# Patient Record
Sex: MALE | Race: OTHER | Employment: FULL TIME | URBAN - METROPOLITAN AREA
[De-identification: names, ages, dates, MRNs, and addresses within clinical notes are randomized per-mention and may not be internally consistent; named-entity substitution may affect disease eponyms.]

---

## 2017-05-24 ENCOUNTER — OFFICE VISIT (OUTPATIENT)
Dept: FAMILY MEDICINE CLINIC | Facility: CLINIC | Age: 52
End: 2017-05-24

## 2017-05-24 VITALS
SYSTOLIC BLOOD PRESSURE: 138 MMHG | BODY MASS INDEX: 42.23 KG/M2 | HEART RATE: 73 BPM | WEIGHT: 295 LBS | DIASTOLIC BLOOD PRESSURE: 86 MMHG | OXYGEN SATURATION: 97 % | TEMPERATURE: 98 F | HEIGHT: 70 IN

## 2017-05-24 DIAGNOSIS — I10 ESSENTIAL HYPERTENSION: Primary | ICD-10-CM

## 2017-05-24 DIAGNOSIS — R17 ELEVATED BILIRUBIN: Chronic | ICD-10-CM

## 2017-05-24 DIAGNOSIS — J45.20 MILD INTERMITTENT ASTHMA WITHOUT COMPLICATION: ICD-10-CM

## 2017-05-24 DIAGNOSIS — Z00.00 GENERAL MEDICAL EXAMINATION: ICD-10-CM

## 2017-05-24 PROCEDURE — 99214 OFFICE O/P EST MOD 30 MIN: CPT | Performed by: FAMILY MEDICINE

## 2017-05-24 RX ORDER — ALBUTEROL SULFATE 90 UG/1
2 AEROSOL, METERED RESPIRATORY (INHALATION) EVERY 6 HOURS PRN
Qty: 1 INHALER | Refills: 1 | Status: SHIPPED | OUTPATIENT
Start: 2017-05-24 | End: 2019-03-15

## 2017-05-24 RX ORDER — FLUTICASONE PROPIONATE AND SALMETEROL 250; 50 UG/1; UG/1
1 POWDER RESPIRATORY (INHALATION) EVERY 12 HOURS SCHEDULED
Qty: 1 EACH | Refills: 4 | Status: SHIPPED | OUTPATIENT
Start: 2017-05-24 | End: 2018-10-16

## 2017-05-24 RX ORDER — ATENOLOL AND CHLORTHALIDONE 50; 25 MG/1; MG/1
1 TABLET ORAL DAILY
Qty: 90 TABLET | Refills: 0 | Status: SHIPPED | OUTPATIENT
Start: 2017-05-24 | End: 2017-08-15

## 2017-05-24 NOTE — PROGRESS NOTES
HPI:   Patient presents with:  Medication Request  Hypertension  Asthma      PatientNoe Montes. is a 54year old male who presents for recheck of his hypertension.     Home Blood Pressure monitoring within the normal range atenolol chlorthalidon No past surgical history on file.    Social History    Marital Status:              Spouse Name:                       Years of Education:                 Number of children:               Social History Main Topics    Smoking Status: Never Smoker oint deformities, FROM B UE/LE  PSYCH: mood and affect WNL, speech clear, mood and thought congruent  NEURO: A&O x 3, CNII-XII intact B, motor and sensory grossly intact B UE/LE, gait normal    ASSESSMENT AND PLAN:    1.  Patient is a 54year old male who p Wheezing. fluticasone-salmeterol (ADVAIR DISKUS) 250-50 MCG/DOSE Inhalation Aerosol Powder, Breath Activated 1 each 4      Sig: Inhale 1 puff into the lungs every 12 (twelve) hours.            Imaging & Consults:  CT CALCIUM SCORING    Raf Mays

## 2017-05-25 PROBLEM — R17 ELEVATED BILIRUBIN: Chronic | Status: ACTIVE | Noted: 2017-05-25

## 2017-08-15 RX ORDER — ATENOLOL AND CHLORTHALIDONE 50; 25 MG/1; MG/1
TABLET ORAL
Qty: 30 TABLET | Refills: 0 | Status: SHIPPED | OUTPATIENT
Start: 2017-08-15 | End: 2017-12-01

## 2017-08-24 RX ORDER — ATENOLOL AND CHLORTHALIDONE 50; 25 MG/1; MG/1
TABLET ORAL
Qty: 90 TABLET | Refills: 0 | Status: SHIPPED | OUTPATIENT
Start: 2017-08-24 | End: 2017-12-29

## 2017-11-06 ENCOUNTER — LAB SERVICES (OUTPATIENT)
Dept: OTHER | Age: 52
End: 2017-11-06

## 2017-11-06 ENCOUNTER — CHARTING TRANS (OUTPATIENT)
Dept: OTHER | Age: 52
End: 2017-11-06

## 2017-11-06 LAB — RAPID STREP GROUP A: NORMAL

## 2017-11-06 ASSESSMENT — PAIN SCALES - GENERAL: PAINLEVEL_OUTOF10: 7

## 2017-12-01 RX ORDER — ATENOLOL AND CHLORTHALIDONE 50; 25 MG/1; MG/1
TABLET ORAL
Qty: 30 TABLET | Refills: 0 | Status: SHIPPED | OUTPATIENT
Start: 2017-12-01 | End: 2018-01-29

## 2017-12-01 NOTE — TELEPHONE ENCOUNTER
[address], [place]   Request a refill for:    Atenolol-Chlorthalidone 50-25 mg oral tab  Sig: Take 1 tablet PO QD  Qty: 30    LOV 5/24/17  NOV due now; LMOVM to call for appt.    LRF 8/24/17  No recent labs

## 2018-01-03 RX ORDER — ATENOLOL AND CHLORTHALIDONE 50; 25 MG/1; MG/1
TABLET ORAL
Qty: 30 TABLET | Refills: 0 | Status: SHIPPED | OUTPATIENT
Start: 2018-01-03 | End: 2018-01-28

## 2018-01-29 RX ORDER — ATENOLOL AND CHLORTHALIDONE TABLET 50; 25 MG/1; MG/1
TABLET ORAL
Qty: 30 TABLET | Refills: 0 | Status: SHIPPED | OUTPATIENT
Start: 2018-01-29 | End: 2018-10-16

## 2018-03-21 RX ORDER — ATENOLOL AND CHLORTHALIDONE 50; 25 MG/1; MG/1
TABLET ORAL
Qty: 30 TABLET | Refills: 0 | OUTPATIENT
Start: 2018-03-21

## 2018-03-23 RX ORDER — ATENOLOL AND CHLORTHALIDONE 50; 25 MG/1; MG/1
TABLET ORAL
Qty: 30 TABLET | Refills: 0 | OUTPATIENT
Start: 2018-03-23

## 2018-10-16 NOTE — TELEPHONE ENCOUNTER
A refill request was received for:  Requested Prescriptions     Pending Prescriptions Disp Refills   • fluticasone-salmeterol (ADVAIR DISKUS) 250-50 MCG/DOSE Inhalation Aerosol Powder, Breath Activated 1 each 4     Sig: Inhale 1 puff into the lungs every 1

## 2018-10-17 RX ORDER — FLUTICASONE PROPIONATE AND SALMETEROL 250; 50 UG/1; UG/1
1 POWDER RESPIRATORY (INHALATION) EVERY 12 HOURS SCHEDULED
Qty: 1 EACH | Refills: 0 | Status: SHIPPED | OUTPATIENT
Start: 2018-10-17 | End: 2019-01-08

## 2018-10-17 RX ORDER — ATENOLOL AND CHLORTHALIDONE 50; 25 MG/1; MG/1
1 TABLET ORAL DAILY
Qty: 30 TABLET | Refills: 0 | Status: SHIPPED | OUTPATIENT
Start: 2018-10-17 | End: 2018-11-09

## 2018-10-22 ENCOUNTER — TELEPHONE (OUTPATIENT)
Dept: FAMILY MEDICINE CLINIC | Facility: CLINIC | Age: 53
End: 2018-10-22

## 2018-10-22 NOTE — TELEPHONE ENCOUNTER
Rcvd fax from pt regarding employee health screening labs. Pt indicates on the fax cover sheet his BP has been elevated d/t him not taking BP meds x90 days. Pt states he is back on his medication and is having better BP readings.  Lab results entered into t

## 2018-11-02 VITALS
RESPIRATION RATE: 16 BRPM | BODY MASS INDEX: 41.52 KG/M2 | HEART RATE: 91 BPM | HEIGHT: 70 IN | WEIGHT: 289.99 LBS | TEMPERATURE: 100.7 F

## 2018-11-09 ENCOUNTER — OFFICE VISIT (OUTPATIENT)
Dept: FAMILY MEDICINE CLINIC | Facility: CLINIC | Age: 53
End: 2018-11-09
Payer: COMMERCIAL

## 2018-11-09 DIAGNOSIS — I10 ESSENTIAL HYPERTENSION: ICD-10-CM

## 2018-11-09 DIAGNOSIS — Z12.5 PROSTATE CANCER SCREENING: ICD-10-CM

## 2018-11-09 DIAGNOSIS — B00.1 HERPES LABIALIS: ICD-10-CM

## 2018-11-09 DIAGNOSIS — Z00.00 ROUTINE MEDICAL EXAM: Primary | ICD-10-CM

## 2018-11-09 DIAGNOSIS — J45.20 MILD INTERMITTENT ASTHMA WITHOUT COMPLICATION: ICD-10-CM

## 2018-11-09 DIAGNOSIS — R17 ELEVATED BILIRUBIN: ICD-10-CM

## 2018-11-09 DIAGNOSIS — Z12.11 SCREENING FOR COLON CANCER: ICD-10-CM

## 2018-11-09 PROCEDURE — 85025 COMPLETE CBC W/AUTO DIFF WBC: CPT | Performed by: FAMILY MEDICINE

## 2018-11-09 PROCEDURE — 99396 PREV VISIT EST AGE 40-64: CPT | Performed by: FAMILY MEDICINE

## 2018-11-09 PROCEDURE — 84153 ASSAY OF PSA TOTAL: CPT | Performed by: FAMILY MEDICINE

## 2018-11-09 PROCEDURE — 84443 ASSAY THYROID STIM HORMONE: CPT | Performed by: FAMILY MEDICINE

## 2018-11-09 PROCEDURE — 99213 OFFICE O/P EST LOW 20 MIN: CPT | Performed by: FAMILY MEDICINE

## 2018-11-09 RX ORDER — VALACYCLOVIR HYDROCHLORIDE 1 G/1
TABLET, FILM COATED ORAL
Qty: 8 TABLET | Refills: 1 | Status: SHIPPED | OUTPATIENT
Start: 2018-11-09 | End: 2021-09-23

## 2018-11-09 RX ORDER — ATENOLOL AND CHLORTHALIDONE 50; 25 MG/1; MG/1
1 TABLET ORAL DAILY
Qty: 90 TABLET | Refills: 0 | Status: SHIPPED | OUTPATIENT
Start: 2018-11-09 | End: 2019-01-08

## 2018-11-10 NOTE — H&P
HPI:   Patient presents with:  Physical  Hypertension  Derm Problem  Asthma  Abnormal Labs: elevated Bili-chronic      Denise Green. is a 48year old male who presents for a complete physical exam.     Patient has new complaints of:  Hypertension TSH 2.89 11/09/2018 04:28 PM    T4F 1.2 07/03/2014 07:09 AM        Lab Results   Component Value Date/Time    CHOLEST 137 10/10/2018    HDL 47 10/10/2018    TRIG 112 10/10/2018    LDL 72 10/07/2016 08:35 AM    NONHDLC 88 10/07/2016 08:35 AM       Lab Resul Standard drinks or equivalent per week        Frequency: 2-3 times a week        Drinks per session: 7 to 9        Binge frequency: Weekly      Drug use: No      Sexual activity: Not on file    Other Topics      Concerns:        Caffeine Concern: Not Asked no other significant joint deformities, FROM B UE/LE  PSYCH: mood and affect WNL, speech clear, mood and thought congruent  NEURO: A&O x 3, CNII-XII intact B, motor and sensory grossly intact B UE/LE, DTR's 2/4 B LE, gait WNL     ASSESSMENT AND PLAN:   1. results, follow-up pending results and in 6 months if readings within normal limits  - Atenolol-Chlorthalidone 50-25 MG Oral Tab; Take 1 tablet by mouth daily. Dispense: 90 tablet; Refill: 0    5.  Mild intermittent asthma without complication  Stable, use

## 2018-11-11 ENCOUNTER — TELEPHONE (OUTPATIENT)
Dept: FAMILY MEDICINE CLINIC | Facility: CLINIC | Age: 53
End: 2018-11-11

## 2018-11-11 VITALS
SYSTOLIC BLOOD PRESSURE: 130 MMHG | HEIGHT: 68 IN | BODY MASS INDEX: 45.01 KG/M2 | OXYGEN SATURATION: 97 % | RESPIRATION RATE: 18 BRPM | HEART RATE: 69 BPM | DIASTOLIC BLOOD PRESSURE: 80 MMHG | WEIGHT: 297 LBS

## 2018-11-16 ENCOUNTER — MED REC SCAN ONLY (OUTPATIENT)
Dept: FAMILY MEDICINE CLINIC | Facility: CLINIC | Age: 53
End: 2018-11-16

## 2019-01-08 DIAGNOSIS — I10 ESSENTIAL HYPERTENSION: ICD-10-CM

## 2019-01-08 RX ORDER — ATENOLOL AND CHLORTHALIDONE 50; 25 MG/1; MG/1
1 TABLET ORAL DAILY
Qty: 90 TABLET | Refills: 0 | Status: SHIPPED | OUTPATIENT
Start: 2019-01-08 | End: 2019-03-15

## 2019-01-08 RX ORDER — FLUTICASONE PROPIONATE AND SALMETEROL 250; 50 UG/1; UG/1
1 POWDER RESPIRATORY (INHALATION) EVERY 12 HOURS SCHEDULED
Qty: 1 EACH | Refills: 0 | Status: SHIPPED | OUTPATIENT
Start: 2019-01-08 | End: 2019-03-15

## 2019-01-08 NOTE — TELEPHONE ENCOUNTER
A refill request was received for:  Requested Prescriptions     Pending Prescriptions Disp Refills   • Atenolol-Chlorthalidone 50-25 MG Oral Tab 90 tablet 0     Sig: Take 1 tablet by mouth daily.      Signed Prescriptions Disp Refills   • fluticasone-salmet

## 2019-01-31 PROBLEM — D12.3 BENIGN NEOPLASM OF TRANSVERSE COLON: Status: ACTIVE | Noted: 2019-01-31

## 2019-01-31 PROBLEM — Z12.11 SPECIAL SCREENING FOR MALIGNANT NEOPLASM OF COLON: Status: ACTIVE | Noted: 2019-01-31

## 2019-01-31 PROBLEM — Z80.0 FAMILY HISTORY OF MALIGNANT NEOPLASM OF DIGESTIVE ORGAN: Status: ACTIVE | Noted: 2019-01-31

## 2019-03-14 RX ORDER — FLUTICASONE PROPIONATE AND SALMETEROL 50; 250 UG/1; UG/1
POWDER RESPIRATORY (INHALATION)
Refills: 4 | OUTPATIENT
Start: 2019-03-14

## 2019-03-15 DIAGNOSIS — I10 ESSENTIAL HYPERTENSION: ICD-10-CM

## 2019-03-15 RX ORDER — ALBUTEROL SULFATE 90 UG/1
2 AEROSOL, METERED RESPIRATORY (INHALATION) EVERY 6 HOURS PRN
Qty: 1 INHALER | Refills: 1 | Status: SHIPPED | OUTPATIENT
Start: 2019-03-15 | End: 2019-05-03

## 2019-03-15 RX ORDER — ATENOLOL AND CHLORTHALIDONE 50; 25 MG/1; MG/1
1 TABLET ORAL DAILY
Qty: 90 TABLET | Refills: 0 | Status: SHIPPED | OUTPATIENT
Start: 2019-03-15 | End: 2019-09-24

## 2019-03-15 RX ORDER — FLUTICASONE PROPIONATE AND SALMETEROL 250; 50 UG/1; UG/1
1 POWDER RESPIRATORY (INHALATION) EVERY 12 HOURS SCHEDULED
Qty: 1 EACH | Refills: 0 | Status: SHIPPED | OUTPATIENT
Start: 2019-03-15 | End: 2019-03-23

## 2019-03-15 NOTE — TELEPHONE ENCOUNTER
A refill request was received for:  Requested Prescriptions     Pending Prescriptions Disp Refills   • Atenolol-Chlorthalidone 50-25 MG Oral Tab 90 tablet 0     Sig: Take 1 tablet by mouth daily.      Signed Prescriptions Disp Refills   • Albuterol Sulfate

## 2019-03-25 RX ORDER — FLUTICASONE PROPIONATE AND SALMETEROL 50; 250 UG/1; UG/1
POWDER RESPIRATORY (INHALATION)
Qty: 60 EACH | Refills: 0 | Status: SHIPPED | OUTPATIENT
Start: 2019-03-25 | End: 2019-08-01

## 2019-05-10 ENCOUNTER — OFFICE VISIT (OUTPATIENT)
Dept: FAMILY MEDICINE CLINIC | Facility: CLINIC | Age: 54
End: 2019-05-10
Payer: COMMERCIAL

## 2019-05-10 VITALS
HEART RATE: 58 BPM | SYSTOLIC BLOOD PRESSURE: 132 MMHG | OXYGEN SATURATION: 100 % | HEIGHT: 68 IN | WEIGHT: 303 LBS | BODY MASS INDEX: 45.92 KG/M2 | RESPIRATION RATE: 18 BRPM | DIASTOLIC BLOOD PRESSURE: 82 MMHG

## 2019-05-10 DIAGNOSIS — Z00.00 ROUTINE MEDICAL EXAM: ICD-10-CM

## 2019-05-10 DIAGNOSIS — J45.20 MILD INTERMITTENT ASTHMA WITHOUT COMPLICATION: ICD-10-CM

## 2019-05-10 DIAGNOSIS — R17 ELEVATED BILIRUBIN: ICD-10-CM

## 2019-05-10 DIAGNOSIS — Z12.5 PROSTATE CANCER SCREENING: ICD-10-CM

## 2019-05-10 DIAGNOSIS — I10 ESSENTIAL HYPERTENSION: Primary | ICD-10-CM

## 2019-05-10 PROCEDURE — 99214 OFFICE O/P EST MOD 30 MIN: CPT | Performed by: FAMILY MEDICINE

## 2019-05-10 NOTE — PROGRESS NOTES
HPI:   Patient presents with:  HTN  Asthma      Patient Raenelle Schaumann. is a 48year old male who presents for recheck of his hypertension.     Blood Pressure monitoring within the normal range-doesn't check at home  Current medication:  See below: TWELVE HOURS  Disp: 60 each Rfl: 0   Atenolol-Chlorthalidone 50-25 MG Oral Tab Take 1 tablet by mouth daily.  Disp: 90 tablet Rfl: 0   ValACYclovir HCl (VALTREX) 1 G Oral Tab TAKE 2 TABS PO BID X 1-2 DAYS PRN for cold spres Disp: 8 tablet Rfl: 1     No curr 68\"   Wt (!) 303 lb   SpO2 100%   BMI 46.07 kg/m²  Body mass index is 46.07 kg/m².   Wt Readings from Last 3 Encounters:  05/10/19 : (!) 303 lb  11/09/18 : 297 lb  05/24/17 : 295 lb    BP Readings from Last 3 Encounters:  05/10/19 : 132/82  11/09/18 : 130/ exam  RTC 6 mo  - CBC WITH DIFFERENTIAL WITH PLATELET; Future  - COMP METABOLIC PANEL (14); Future  - LIPID PANEL; Future  - EKG 12-LEAD; Future    5.  Prostate cancer screening  mikey w labs in 6 mo  - PSA SCREEN; Future        Meds & Refills for this Visit:

## 2019-08-01 RX ORDER — FLUTICASONE PROPIONATE AND SALMETEROL 50; 250 UG/1; UG/1
POWDER RESPIRATORY (INHALATION)
Qty: 60 EACH | Refills: 0 | Status: SHIPPED | OUTPATIENT
Start: 2019-08-01 | End: 2019-10-30

## 2019-08-01 NOTE — TELEPHONE ENCOUNTER
Rx passes protocol - rx refilled.   Asthma; AAS > 20, apt in past 6 m, AAP and ACT given in past 12 m

## 2019-09-24 DIAGNOSIS — I10 ESSENTIAL HYPERTENSION: ICD-10-CM

## 2019-09-24 RX ORDER — ATENOLOL AND CHLORTHALIDONE 50; 25 MG/1; MG/1
TABLET ORAL
Qty: 60 TABLET | Refills: 0 | Status: SHIPPED | OUTPATIENT
Start: 2019-09-24 | End: 2019-11-15

## 2019-09-24 NOTE — TELEPHONE ENCOUNTER
A refill request was received for:  Requested Prescriptions     Pending Prescriptions Disp Refills   • ATENOLOL-CHLORTHALIDONE 50-25 MG Oral Tab [Pharmacy Med Name: Atenolol-Chlorthalidone Oral Tablet 50-25 MG] 30 tablet 0     Sig: TAKE 1 TABLET BY MOUTH O

## 2019-10-30 DIAGNOSIS — I10 ESSENTIAL HYPERTENSION: ICD-10-CM

## 2019-10-30 RX ORDER — FLUTICASONE PROPIONATE AND SALMETEROL 50; 250 UG/1; UG/1
POWDER RESPIRATORY (INHALATION)
Qty: 1 EACH | Refills: 0 | Status: SHIPPED | OUTPATIENT
Start: 2019-10-30 | End: 2019-11-15

## 2019-10-30 RX ORDER — ATENOLOL AND CHLORTHALIDONE 50; 25 MG/1; MG/1
TABLET ORAL
Qty: 90 TABLET | Refills: 0 | Status: SHIPPED | OUTPATIENT
Start: 2019-10-30 | End: 2019-11-15

## 2019-11-15 ENCOUNTER — APPOINTMENT (OUTPATIENT)
Dept: LAB | Facility: REFERENCE LAB | Age: 54
End: 2019-11-15
Attending: FAMILY MEDICINE
Payer: COMMERCIAL

## 2019-11-15 ENCOUNTER — OFFICE VISIT (OUTPATIENT)
Dept: FAMILY MEDICINE CLINIC | Facility: CLINIC | Age: 54
End: 2019-11-15
Payer: COMMERCIAL

## 2019-11-15 ENCOUNTER — APPOINTMENT (OUTPATIENT)
Dept: LAB | Age: 54
End: 2019-11-15
Attending: FAMILY MEDICINE
Payer: COMMERCIAL

## 2019-11-15 VITALS
HEART RATE: 52 BPM | TEMPERATURE: 99 F | SYSTOLIC BLOOD PRESSURE: 134 MMHG | OXYGEN SATURATION: 96 % | HEIGHT: 68.5 IN | DIASTOLIC BLOOD PRESSURE: 80 MMHG | WEIGHT: 304 LBS | BODY MASS INDEX: 45.54 KG/M2 | RESPIRATION RATE: 18 BRPM

## 2019-11-15 DIAGNOSIS — I10 ESSENTIAL HYPERTENSION: ICD-10-CM

## 2019-11-15 DIAGNOSIS — Z12.5 PROSTATE CANCER SCREENING: ICD-10-CM

## 2019-11-15 DIAGNOSIS — Z00.00 ROUTINE MEDICAL EXAM: ICD-10-CM

## 2019-11-15 DIAGNOSIS — J06.9 UPPER RESPIRATORY TRACT INFECTION, UNSPECIFIED TYPE: ICD-10-CM

## 2019-11-15 DIAGNOSIS — I10 ESSENTIAL HYPERTENSION: Primary | ICD-10-CM

## 2019-11-15 DIAGNOSIS — J45.20 MILD INTERMITTENT ASTHMA WITHOUT COMPLICATION: ICD-10-CM

## 2019-11-15 PROBLEM — B00.1 HERPES LABIALIS: Status: ACTIVE | Noted: 2019-11-15

## 2019-11-15 PROCEDURE — 99214 OFFICE O/P EST MOD 30 MIN: CPT | Performed by: FAMILY MEDICINE

## 2019-11-15 PROCEDURE — 36415 COLL VENOUS BLD VENIPUNCTURE: CPT | Performed by: FAMILY MEDICINE

## 2019-11-15 PROCEDURE — 93010 ELECTROCARDIOGRAM REPORT: CPT | Performed by: FAMILY MEDICINE

## 2019-11-15 PROCEDURE — 85025 COMPLETE CBC W/AUTO DIFF WBC: CPT | Performed by: FAMILY MEDICINE

## 2019-11-15 PROCEDURE — 80061 LIPID PANEL: CPT | Performed by: FAMILY MEDICINE

## 2019-11-15 PROCEDURE — 80053 COMPREHEN METABOLIC PANEL: CPT | Performed by: FAMILY MEDICINE

## 2019-11-15 PROCEDURE — 93005 ELECTROCARDIOGRAM TRACING: CPT

## 2019-11-15 RX ORDER — ATENOLOL AND CHLORTHALIDONE 50; 25 MG/1; MG/1
TABLET ORAL
Qty: 90 TABLET | Refills: 0 | Status: SHIPPED | OUTPATIENT
Start: 2019-11-15 | End: 2020-03-02

## 2019-11-15 RX ORDER — AZITHROMYCIN 500 MG/1
500 TABLET, FILM COATED ORAL DAILY
Qty: 5 TABLET | Refills: 0 | Status: SHIPPED | OUTPATIENT
Start: 2019-11-15 | End: 2019-11-20

## 2019-11-15 RX ORDER — FLUTICASONE PROPIONATE AND SALMETEROL 250; 50 UG/1; UG/1
POWDER RESPIRATORY (INHALATION)
Qty: 1 EACH | Refills: 3 | Status: SHIPPED | OUTPATIENT
Start: 2019-11-15 | End: 2020-02-14

## 2019-11-15 RX ORDER — AZITHROMYCIN 500 MG/1
500 TABLET, FILM COATED ORAL DAILY
Qty: 5 TABLET | Refills: 0 | Status: SHIPPED | OUTPATIENT
Start: 2019-11-15 | End: 2019-11-15

## 2019-11-15 NOTE — PROGRESS NOTES
HPI:   Patient presents with:  URI  Cough  Sore Throat  Asthma      Patient Lynn Kim. is a 48year old male who presents for recheck of his hypertension.     Home Blood Pressure monitoring mildly elevated-'s  Current medication:  See be Medications:  PROAIR  (90 Base) MCG/ACT Inhalation Aero Soln, INHALE 2 PUFFS BY MOUTH EVERY SIX HOURS AS NEEDED  for wheezing., Disp: 8.5 g, Rfl: 0  ValACYclovir HCl (VALTREX) 1 G Oral Tab, TAKE 2 TABS PO BID X 1-2 DAYS PRN for cold spres, Disp: 8 Location: Right arm)   Pulse 52   Temp 98.7 °F (37.1 °C) (Oral)   Resp 18   Ht 68.5\"   Wt (!) 304 lb (137.9 kg)   SpO2 96%    L/min   BMI 45.55 kg/m²  Body mass index is 45.55 kg/m².   Wt Readings from Last 3 Encounters:  11/15/19 : (!) 304 lb (137.9 Atenolol-Chlorthalidone 50-25 MG Oral Tab; TAKE 1 TABLET BY MOUTH ONE TIME A DAY  Dispense: 90 tablet; Refill: 0  - CT CALCIUM SCORING; Future    2.  Mild intermittent asthma without complication  Stable, will CPM, f/u 6 mo   ACT: 23, AAP completed today, p

## 2019-11-21 ENCOUNTER — TELEPHONE (OUTPATIENT)
Dept: FAMILY MEDICINE CLINIC | Facility: CLINIC | Age: 54
End: 2019-11-21

## 2019-11-21 NOTE — TELEPHONE ENCOUNTER
Rcvd pt's CT Calcium Scoring from: [facility]    Left Main Artery: 0  Left Anterior Descendin  Left Circumflex: 0  Right Coronary Artery: 0  Total Score: 18  Report placed on provider's desk for review; send to scan when complete.

## 2020-02-13 ENCOUNTER — TELEPHONE (OUTPATIENT)
Dept: FAMILY MEDICINE CLINIC | Facility: CLINIC | Age: 55
End: 2020-02-13

## 2020-02-13 NOTE — TELEPHONE ENCOUNTER
Please see if patient can come in at 2:30 on 2/14/20 instead of 3:30 (if not then a little earlier if able, if not ok too, as I'm flying [place] directly after work that day) , then block rest of day, ok to open up ALL other C___ family spots (no need to place

## 2020-02-13 NOTE — TELEPHONE ENCOUNTER
Patient is now at 11:30a for 2/14/20. Prescription sent for Linzess 290mcg take 145mcg q day.  Informed pt that note in Op note says increase to Linzess 290mcg q day.

## 2020-02-14 ENCOUNTER — OFFICE VISIT (OUTPATIENT)
Dept: FAMILY MEDICINE CLINIC | Facility: CLINIC | Age: 55
End: 2020-02-14
Payer: COMMERCIAL

## 2020-02-14 VITALS
WEIGHT: 295 LBS | BODY MASS INDEX: 45.23 KG/M2 | RESPIRATION RATE: 18 BRPM | DIASTOLIC BLOOD PRESSURE: 78 MMHG | SYSTOLIC BLOOD PRESSURE: 120 MMHG | TEMPERATURE: 98 F | OXYGEN SATURATION: 98 % | HEART RATE: 58 BPM | HEIGHT: 67.8 IN

## 2020-02-14 DIAGNOSIS — Z00.00 ROUTINE MEDICAL EXAM: Primary | ICD-10-CM

## 2020-02-14 DIAGNOSIS — R17 ELEVATED BILIRUBIN: ICD-10-CM

## 2020-02-14 DIAGNOSIS — Z12.11 SCREENING FOR COLON CANCER: ICD-10-CM

## 2020-02-14 DIAGNOSIS — I10 ESSENTIAL HYPERTENSION: ICD-10-CM

## 2020-02-14 DIAGNOSIS — J45.20 MILD INTERMITTENT ASTHMA WITHOUT COMPLICATION: ICD-10-CM

## 2020-02-14 PROCEDURE — 90715 TDAP VACCINE 7 YRS/> IM: CPT | Performed by: FAMILY MEDICINE

## 2020-02-14 PROCEDURE — 99396 PREV VISIT EST AGE 40-64: CPT | Performed by: FAMILY MEDICINE

## 2020-02-14 PROCEDURE — 99213 OFFICE O/P EST LOW 20 MIN: CPT | Performed by: FAMILY MEDICINE

## 2020-02-14 PROCEDURE — 90471 IMMUNIZATION ADMIN: CPT | Performed by: FAMILY MEDICINE

## 2020-02-14 RX ORDER — FLUTICASONE PROPIONATE AND SALMETEROL 250; 50 UG/1; UG/1
POWDER RESPIRATORY (INHALATION)
Qty: 1 EACH | Refills: 3 | Status: SHIPPED | OUTPATIENT
Start: 2020-02-14 | End: 2021-05-18

## 2020-02-14 NOTE — H&P
HPI:   Patient presents with:  Physical  HTN  Asthma      Lowell Ross. is a 48year old male who presents for a complete physical exam.     Patient has new complaints of:  Hypertension recheck:  Home Blood Pressure monitoring has  been within th Jerry 93 11/15/2019 08:54 AM       Lab Results   Component Value Date/Time    A1C 4.8 10/07/2016 08:35 AM      No results found for: VITD        Current Outpatient Medications   Medication Sig Dispense Refill   • fluticasone-salmeterol (ADVAIR DISKUS) 25 Binge frequency: Weekly        Comment: Beer      Drug use: No    Other Topics      Concerns:        REVIEW OF SYSTEMS:   Pertinent positives and negatives noted in the the HPI.  Specifically:  GEN:  No fever or fatigue  HEAD:  No headaches  EYES:  No visio grossly NL B UE/LE, no other significant joint deformities, FROM B UE/LE  PSYCH: mood and affect WNL, speech clear, mood and thought congruent  NEURO: A&O x 3, CNII-XII intact B, motor and sensory grossly intact B UE/LE, mobile mass 2/4 B LE, gait WNL Encounter      Tdap      Meds & Refills for this Visit:  Requested Prescriptions     Signed Prescriptions Disp Refills   • fluticasone-salmeterol (ADVAIR DISKUS) 250-50 MCG/DOSE Inhalation Aerosol Powder, Breath Activated 1 each 3     Sig: INHALE 1 PUFF BY

## 2020-02-29 DIAGNOSIS — I10 ESSENTIAL HYPERTENSION: ICD-10-CM

## 2020-03-02 RX ORDER — ATENOLOL AND CHLORTHALIDONE 50; 25 MG/1; MG/1
TABLET ORAL
Qty: 90 TABLET | Refills: 0 | Status: SHIPPED | OUTPATIENT
Start: 2020-03-02 | End: 2020-08-05

## 2020-08-05 DIAGNOSIS — I10 ESSENTIAL HYPERTENSION: ICD-10-CM

## 2020-08-05 RX ORDER — ATENOLOL AND CHLORTHALIDONE 50; 25 MG/1; MG/1
TABLET ORAL
Qty: 90 TABLET | Refills: 0 | Status: SHIPPED | OUTPATIENT
Start: 2020-08-05 | End: 2020-11-19

## 2020-08-30 ENCOUNTER — TELEPHONE (OUTPATIENT)
Dept: FAMILY MEDICINE CLINIC | Facility: CLINIC | Age: 55
End: 2020-08-30

## 2020-08-31 NOTE — TELEPHONE ENCOUNTER
Patient has office visit with me Tuesday morning.   Please let him know that the lab in our building is closed

## 2020-08-31 NOTE — TELEPHONE ENCOUNTER
LVM for pt informing him of below and that labs require scheduled appts at this time. Provided central scheduling phone number in message.

## 2020-09-01 ENCOUNTER — OFFICE VISIT (OUTPATIENT)
Dept: FAMILY MEDICINE CLINIC | Facility: CLINIC | Age: 55
End: 2020-09-01
Payer: COMMERCIAL

## 2020-09-01 VITALS
WEIGHT: 282 LBS | OXYGEN SATURATION: 97 % | HEIGHT: 67.8 IN | SYSTOLIC BLOOD PRESSURE: 130 MMHG | DIASTOLIC BLOOD PRESSURE: 80 MMHG | BODY MASS INDEX: 43.24 KG/M2 | HEART RATE: 53 BPM

## 2020-09-01 DIAGNOSIS — M79.641 HAND PAIN, RIGHT: ICD-10-CM

## 2020-09-01 DIAGNOSIS — Z00.00 ROUTINE MEDICAL EXAM: ICD-10-CM

## 2020-09-01 DIAGNOSIS — R17 ELEVATED BILIRUBIN: ICD-10-CM

## 2020-09-01 DIAGNOSIS — J45.20 MILD INTERMITTENT ASTHMA WITHOUT COMPLICATION: ICD-10-CM

## 2020-09-01 DIAGNOSIS — G62.9 NEUROPATHY: ICD-10-CM

## 2020-09-01 DIAGNOSIS — I10 ESSENTIAL HYPERTENSION: Primary | ICD-10-CM

## 2020-09-01 DIAGNOSIS — Z12.5 PROSTATE CANCER SCREENING: ICD-10-CM

## 2020-09-01 PROCEDURE — 3075F SYST BP GE 130 - 139MM HG: CPT | Performed by: FAMILY MEDICINE

## 2020-09-01 PROCEDURE — 99214 OFFICE O/P EST MOD 30 MIN: CPT | Performed by: FAMILY MEDICINE

## 2020-09-01 PROCEDURE — 3079F DIAST BP 80-89 MM HG: CPT | Performed by: FAMILY MEDICINE

## 2020-09-01 PROCEDURE — 3008F BODY MASS INDEX DOCD: CPT | Performed by: FAMILY MEDICINE

## 2020-09-01 NOTE — H&P
HPI:   Patient presents with: Follow - Up  HTN  Musculoskeletal Problem  Asthma      Patient Efrain Osborn. is a 48year old male who presents for recheck of his hypertension.     Home Blood Pressure monitoring within the normal range  Current med 08:54 AM    AST 18 11/15/2019 08:54 AM    ALT 28 11/15/2019 08:54 AM    BILT 1.4 11/15/2019 08:54 AM    TSH 2.89 11/09/2018 04:28 PM    T4F 1.2 07/03/2014 07:09 AM           Medications:  ATENOLOL-CHLORTHALIDONE 50-25 MG Oral Tab, TAKE 1 TABLET BY MOUTH ON excessive fatigue  HEAD:  No headaches, no dizziness  EYES:  No new vision change or complaints  EARS:  No new hearing loss, no ear pain  MOUTH/THROAT:  No sore throat or dental problems, no oral lesions  HEART:  No chest pain or palpitations  LUNG:  No SO CPM  · Recommend low fat, healthy DASH diet and exercise 3-4 times a week. · The patient is asked to return in 6 month(s). · The patient indicates understanding of these recommendtions and agrees to the above plan. Additional Assessment and Plan:  1.

## 2020-10-17 ENCOUNTER — OFFICE VISIT (OUTPATIENT)
Dept: FAMILY MEDICINE CLINIC | Facility: CLINIC | Age: 55
End: 2020-10-17
Payer: COMMERCIAL

## 2020-10-17 VITALS
BODY MASS INDEX: 41.47 KG/M2 | TEMPERATURE: 98 F | WEIGHT: 280 LBS | HEIGHT: 69 IN | HEART RATE: 104 BPM | SYSTOLIC BLOOD PRESSURE: 144 MMHG | OXYGEN SATURATION: 97 % | DIASTOLIC BLOOD PRESSURE: 76 MMHG | RESPIRATION RATE: 18 BRPM

## 2020-10-17 DIAGNOSIS — Z20.822 EXPOSURE TO COVID-19 VIRUS: Primary | ICD-10-CM

## 2020-10-17 PROCEDURE — 90471 IMMUNIZATION ADMIN: CPT | Performed by: NURSE PRACTITIONER

## 2020-10-17 PROCEDURE — 99213 OFFICE O/P EST LOW 20 MIN: CPT | Performed by: NURSE PRACTITIONER

## 2020-10-17 PROCEDURE — 3008F BODY MASS INDEX DOCD: CPT | Performed by: NURSE PRACTITIONER

## 2020-10-17 PROCEDURE — U0003 INFECTIOUS AGENT DETECTION BY NUCLEIC ACID (DNA OR RNA); SEVERE ACUTE RESPIRATORY SYNDROME CORONAVIRUS 2 (SARS-COV-2) (CORONAVIRUS DISEASE [COVID-19]), AMPLIFIED PROBE TECHNIQUE, MAKING USE OF HIGH THROUGHPUT TECHNOLOGIES AS DESCRIBED BY CMS-2020-01-R: HCPCS | Performed by: NURSE PRACTITIONER

## 2020-10-17 PROCEDURE — 3078F DIAST BP <80 MM HG: CPT | Performed by: NURSE PRACTITIONER

## 2020-10-17 PROCEDURE — 90686 IIV4 VACC NO PRSV 0.5 ML IM: CPT | Performed by: NURSE PRACTITIONER

## 2020-10-17 PROCEDURE — 3077F SYST BP >= 140 MM HG: CPT | Performed by: NURSE PRACTITIONER

## 2020-10-17 NOTE — PROGRESS NOTES
CHIEF COMPLAINT:   Patient presents with:  Covid: exposure to covid went to house party last saturday       HPI:   Raenelle Schaumann. is a 48year old male who presents for Covid 19 exposure 7 days ago. Reports no symptoms. Requesting covid testing. GI: denies N/V/C or abdominal pain  NEURO: Denies headaches    EXAM:   /76   Pulse 104   Temp 98.1 °F (36.7 °C) (Oral)   Resp 18   Ht 69\"   Wt 280 lb (127 kg)   SpO2 97%   BMI 41.35 kg/m²   GENERAL: well developed, well nourished, in no apparent d

## 2020-11-18 DIAGNOSIS — I10 ESSENTIAL HYPERTENSION: ICD-10-CM

## 2020-11-19 RX ORDER — ATENOLOL AND CHLORTHALIDONE 50; 25 MG/1; MG/1
TABLET ORAL
Qty: 90 TABLET | Refills: 0 | Status: SHIPPED | OUTPATIENT
Start: 2020-11-19 | End: 2021-02-23

## 2020-11-19 NOTE — TELEPHONE ENCOUNTER
A refill request was received for:  Requested Prescriptions     Pending Prescriptions Disp Refills   • ATENOLOL-CHLORTHALIDONE 50-25 MG Oral Tab [Pharmacy Med Name: Atenolol-Chlorthalidone Oral Tablet 50-25 MG] 90 tablet 0     Sig: TAKE 1 TABLET BY MOUTH O

## 2021-02-23 DIAGNOSIS — I10 ESSENTIAL HYPERTENSION: ICD-10-CM

## 2021-02-23 RX ORDER — ATENOLOL AND CHLORTHALIDONE 50; 25 MG/1; MG/1
TABLET ORAL
Qty: 90 TABLET | Refills: 0 | Status: SHIPPED | OUTPATIENT
Start: 2021-02-23 | End: 2021-06-18

## 2021-02-23 NOTE — TELEPHONE ENCOUNTER
A refill request was received for:  Requested Prescriptions     Pending Prescriptions Disp Refills   • Atenolol-Chlorthalidone 50-25 MG Oral Tab [Pharmacy Med Name: Atenolol-Chlorthalidone Oral Tablet 50-25 MG] 90 tablet 0     Sig: TAKE 1 TABLET BY MOUTH E

## 2021-04-01 ENCOUNTER — TELEPHONE (OUTPATIENT)
Dept: FAMILY MEDICINE CLINIC | Facility: CLINIC | Age: 56
End: 2021-04-01

## 2021-04-01 ENCOUNTER — IMMUNIZATION (OUTPATIENT)
Dept: LAB | Age: 56
End: 2021-04-01
Attending: HOSPITALIST
Payer: COMMERCIAL

## 2021-04-01 DIAGNOSIS — Z23 NEED FOR VACCINATION: Primary | ICD-10-CM

## 2021-04-01 PROCEDURE — 0001A SARSCOV2 VAC 30MCG/0.3ML IM: CPT

## 2021-04-22 ENCOUNTER — IMMUNIZATION (OUTPATIENT)
Dept: LAB | Age: 56
End: 2021-04-22
Attending: HOSPITALIST
Payer: COMMERCIAL

## 2021-04-22 DIAGNOSIS — Z23 NEED FOR VACCINATION: Primary | ICD-10-CM

## 2021-04-22 PROCEDURE — 0002A SARSCOV2 VAC 30MCG/0.3ML IM: CPT

## 2021-05-18 DIAGNOSIS — J45.20 MILD INTERMITTENT ASTHMA WITHOUT COMPLICATION: ICD-10-CM

## 2021-05-18 RX ORDER — FLUTICASONE PROPIONATE AND SALMETEROL 250; 50 UG/1; UG/1
POWDER RESPIRATORY (INHALATION)
Qty: 1 EACH | Refills: 3 | Status: SHIPPED | OUTPATIENT
Start: 2021-05-18 | End: 2021-10-20

## 2021-05-18 NOTE — TELEPHONE ENCOUNTER
A refill request was received for:  Requested Prescriptions     Pending Prescriptions Disp Refills   • fluticasone-salmeterol (ADVAIR DISKUS) 250-50 MCG/DOSE Inhalation Aerosol Powder, Breath Activated 1 each 3     Sig: INHALE 1 PUFF BY MOUTH EVERY TWELVE

## 2021-05-21 ENCOUNTER — LAB ENCOUNTER (OUTPATIENT)
Dept: LAB | Age: 56
End: 2021-05-21
Attending: FAMILY MEDICINE
Payer: COMMERCIAL

## 2021-05-21 DIAGNOSIS — Z12.5 PROSTATE CANCER SCREENING: ICD-10-CM

## 2021-05-21 PROCEDURE — 80050 GENERAL HEALTH PANEL: CPT | Performed by: FAMILY MEDICINE

## 2021-05-21 PROCEDURE — 80061 LIPID PANEL: CPT | Performed by: FAMILY MEDICINE

## 2021-05-25 ENCOUNTER — TELEMEDICINE (OUTPATIENT)
Dept: FAMILY MEDICINE CLINIC | Facility: CLINIC | Age: 56
End: 2021-05-25

## 2021-05-25 VITALS — DIASTOLIC BLOOD PRESSURE: 85 MMHG | SYSTOLIC BLOOD PRESSURE: 134 MMHG

## 2021-05-25 DIAGNOSIS — G89.29 CHRONIC BILATERAL LOW BACK PAIN WITH BILATERAL SCIATICA: ICD-10-CM

## 2021-05-25 DIAGNOSIS — I10 ESSENTIAL HYPERTENSION: ICD-10-CM

## 2021-05-25 DIAGNOSIS — R93.89 ABNORMAL X-RAY: Primary | ICD-10-CM

## 2021-05-25 DIAGNOSIS — M54.42 CHRONIC BILATERAL LOW BACK PAIN WITH BILATERAL SCIATICA: ICD-10-CM

## 2021-05-25 DIAGNOSIS — M54.41 CHRONIC BILATERAL LOW BACK PAIN WITH BILATERAL SCIATICA: ICD-10-CM

## 2021-05-25 DIAGNOSIS — R73.09 ALTERED GLUCOSE METABOLISM: ICD-10-CM

## 2021-05-25 PROCEDURE — 3079F DIAST BP 80-89 MM HG: CPT | Performed by: FAMILY MEDICINE

## 2021-05-25 PROCEDURE — 99214 OFFICE O/P EST MOD 30 MIN: CPT | Performed by: FAMILY MEDICINE

## 2021-05-25 PROCEDURE — 3075F SYST BP GE 130 - 139MM HG: CPT | Performed by: FAMILY MEDICINE

## 2021-05-25 RX ORDER — LISINOPRIL 2.5 MG/1
2.5 TABLET ORAL DAILY
Qty: 30 TABLET | Refills: 1 | Status: SHIPPED | OUTPATIENT
Start: 2021-05-25 | End: 2021-08-18

## 2021-05-25 NOTE — PROGRESS NOTES
Due to the real risk of possible exposure to Coronavirus (CoV-2, COVID-19) in the office/medical building and recommendations for social distancing (key to mitigation/limiting the spread of the virus) a Virtual or Telemedicine visit over the phone was perf during year   • Heartburn 2010    3-5 times per year   • Jaundice 1968    Had it when I was a child. • Seasonal allergies    • Wears glasses 1990    No issue, just need to use glasses     History reviewed. No pertinent surgical history.       REVIEW OF SY RIGHT (CPT=41378); Future    3. Altered glucose metabolism  Glucose 114, recommend a well balanced lower carbohydrate, fat and cholesterol diet and exercise 3-4 times a week, minimum 30-40 minutes at a time (as tolerated).   Diabetic education for pre-DM  C

## 2021-05-28 ENCOUNTER — HOSPITAL ENCOUNTER (OUTPATIENT)
Dept: GENERAL RADIOLOGY | Age: 56
Discharge: HOME | End: 2021-05-28
Attending: FAMILY MEDICINE
Payer: COMMERCIAL

## 2021-05-28 DIAGNOSIS — G89.29 CHRONIC BILATERAL LOW BACK PAIN WITH BILATERAL SCIATICA: ICD-10-CM

## 2021-05-28 DIAGNOSIS — M54.42 CHRONIC BILATERAL LOW BACK PAIN WITH BILATERAL SCIATICA: ICD-10-CM

## 2021-05-28 DIAGNOSIS — M54.41 CHRONIC BILATERAL LOW BACK PAIN WITH BILATERAL SCIATICA: ICD-10-CM

## 2021-05-28 DIAGNOSIS — R93.89 ABNORMAL X-RAY: ICD-10-CM

## 2021-05-28 PROCEDURE — 72110 X-RAY EXAM L-2 SPINE 4/>VWS: CPT | Performed by: FAMILY MEDICINE

## 2021-05-28 PROCEDURE — 73523 X-RAY EXAM HIPS BI 5/> VIEWS: CPT | Performed by: FAMILY MEDICINE

## 2021-05-28 NOTE — PROGRESS NOTES
Bharat Lomeli.,    Attached are the results of your recently performed labs/tests:    Your Xray findings show:  A sclerotic lesion right proximal femur, favored to be a benign lesion.   However, given some atypical features further evaluation with

## 2021-05-28 NOTE — PROGRESS NOTES
Bharat Osman.,    Attached are the results of your recently performed labs/tests: All results are within NORMAL limits EXCEPT:    Degenerative changes (arthritis) were noted in your lumbar spine. There were no other abnormalities seen.     Pl

## 2021-06-01 ENCOUNTER — TELEMEDICINE (OUTPATIENT)
Dept: ENDOCRINOLOGY CLINIC | Facility: CLINIC | Age: 56
End: 2021-06-01

## 2021-06-01 DIAGNOSIS — R73.03 PREDIABETES: Primary | ICD-10-CM

## 2021-06-01 PROCEDURE — 97802 MEDICAL NUTRITION INDIV IN: CPT | Performed by: DIETITIAN, REGISTERED

## 2021-06-01 NOTE — PROGRESS NOTES
Raenelle Schaumann.   10/29/1965 was seen for Diabetic Medical Nutrition Therapy:    6/1/2021  Referring Provider: Yissel Quispe MD  Start time: 2:00 End time: 3:00    Due to COVID-19 ACTION PLAN, the patient's office visit was conducted via keturah weight. Taught label reading: focus on counting grams of carb rather than looking at sugar. Discussed macronutrient balance: reduce starch, include lean protein and non-starchy vegetables, also fruit, yogurt and milk using food models.     Gave exampl

## 2021-06-10 ENCOUNTER — HOSPITAL ENCOUNTER (OUTPATIENT)
Dept: MRI IMAGING | Facility: HOSPITAL | Age: 56
Discharge: HOME | End: 2021-06-10
Attending: FAMILY MEDICINE
Payer: COMMERCIAL

## 2021-06-10 DIAGNOSIS — R93.6 ABNORMAL X-RAY OF FEMUR: ICD-10-CM

## 2021-06-10 PROCEDURE — 73721 MRI JNT OF LWR EXTRE W/O DYE: CPT | Performed by: FAMILY MEDICINE

## 2021-06-14 NOTE — PROGRESS NOTES
Bharat Can.,    Attached are the results of your recently performed labs/tests: The Radiologist noted:    1. Sclerotic lesion within the right proximal femur most consistent benign lesion such as a bone island.        2.  Chronic appearing b

## 2021-06-18 DIAGNOSIS — I10 ESSENTIAL HYPERTENSION: ICD-10-CM

## 2021-06-18 RX ORDER — ATENOLOL AND CHLORTHALIDONE 50; 25 MG/1; MG/1
TABLET ORAL
Qty: 90 TABLET | Refills: 0 | Status: SHIPPED | OUTPATIENT
Start: 2021-06-18 | End: 2021-09-23

## 2021-08-18 RX ORDER — LISINOPRIL 2.5 MG/1
TABLET ORAL
Qty: 30 TABLET | Refills: 0 | Status: SHIPPED | OUTPATIENT
Start: 2021-08-18 | End: 2021-09-23

## 2021-09-14 DIAGNOSIS — B00.1 HERPES LABIALIS: ICD-10-CM

## 2021-09-14 DIAGNOSIS — I10 ESSENTIAL HYPERTENSION: ICD-10-CM

## 2021-09-15 NOTE — TELEPHONE ENCOUNTER
LVM to schedule. Return in about 3 weeks (around 6/15/2021) for HTN-just message me your BP record to start, see me in the office in 2-3 months.

## 2021-09-22 DIAGNOSIS — I10 ESSENTIAL HYPERTENSION: ICD-10-CM

## 2021-09-22 DIAGNOSIS — B00.1 HERPES LABIALIS: ICD-10-CM

## 2021-09-23 RX ORDER — ATENOLOL AND CHLORTHALIDONE TABLET 50; 25 MG/1; MG/1
1 TABLET ORAL DAILY
Qty: 90 TABLET | Refills: 0 | OUTPATIENT
Start: 2021-09-23

## 2021-09-23 RX ORDER — LISINOPRIL 2.5 MG/1
2.5 TABLET ORAL DAILY
Qty: 30 TABLET | Refills: 0 | OUTPATIENT
Start: 2021-09-23

## 2021-09-23 RX ORDER — ATENOLOL AND CHLORTHALIDONE TABLET 50; 25 MG/1; MG/1
TABLET ORAL
Qty: 90 TABLET | Refills: 0 | Status: SHIPPED | OUTPATIENT
Start: 2021-09-23 | End: 2021-10-20

## 2021-09-23 RX ORDER — VALACYCLOVIR HYDROCHLORIDE 1 G/1
TABLET, FILM COATED ORAL
Qty: 8 TABLET | Refills: 1 | OUTPATIENT
Start: 2021-09-23

## 2021-09-23 RX ORDER — VALACYCLOVIR HYDROCHLORIDE 1 G/1
TABLET, FILM COATED ORAL
Qty: 8 TABLET | Refills: 0 | Status: SHIPPED | OUTPATIENT
Start: 2021-09-23 | End: 2023-07-18

## 2021-09-23 RX ORDER — LISINOPRIL 2.5 MG/1
TABLET ORAL
Qty: 30 TABLET | Refills: 0 | Status: SHIPPED | OUTPATIENT
Start: 2021-09-23 | End: 2021-10-20

## 2021-10-07 ENCOUNTER — OFFICE VISIT (OUTPATIENT)
Dept: FAMILY MEDICINE CLINIC | Facility: CLINIC | Age: 56
End: 2021-10-07
Payer: COMMERCIAL

## 2021-10-07 VITALS
SYSTOLIC BLOOD PRESSURE: 136 MMHG | HEIGHT: 69 IN | BODY MASS INDEX: 42.12 KG/M2 | DIASTOLIC BLOOD PRESSURE: 74 MMHG | WEIGHT: 284.38 LBS | HEART RATE: 51 BPM | OXYGEN SATURATION: 98 %

## 2021-10-07 DIAGNOSIS — J45.20 MILD INTERMITTENT ASTHMA WITHOUT COMPLICATION: ICD-10-CM

## 2021-10-07 DIAGNOSIS — I10 ESSENTIAL HYPERTENSION: ICD-10-CM

## 2021-10-07 DIAGNOSIS — Z00.00 ADULT GENERAL MEDICAL EXAMINATION: Primary | ICD-10-CM

## 2021-10-07 PROCEDURE — 3078F DIAST BP <80 MM HG: CPT | Performed by: NURSE PRACTITIONER

## 2021-10-07 PROCEDURE — 99396 PREV VISIT EST AGE 40-64: CPT | Performed by: NURSE PRACTITIONER

## 2021-10-07 PROCEDURE — 3008F BODY MASS INDEX DOCD: CPT | Performed by: NURSE PRACTITIONER

## 2021-10-07 PROCEDURE — 3075F SYST BP GE 130 - 139MM HG: CPT | Performed by: NURSE PRACTITIONER

## 2021-10-07 NOTE — PROGRESS NOTES
Chief Complaint:   Patient presents with:  Physical      HPI:   This is a 50year old male coming in for complete physical exam. He feels well overall. Recently received flu, shingles, and pneumonia vaccines at pharmacy. Has recently cut down on ETOH use. Allergies  Current Meds:  Current Outpatient Medications   Medication Sig Dispense Refill   • LISINOPRIL 2.5 MG Oral Tab TAKE 1 TABLET BY MOUTH EVERY DAY 30 tablet 0   • ATENOLOL-CHLORTHALIDONE 50-25 MG Oral Tab TAKE 1 TABLET BY MOUTH EVERY DAY 90 tablet 0 developed, well nourished. HEENT:  Head:  Normocephalic, atraumatic Eyes: EOMI, PERRLA, conjunctivae clear bilaterally, no eye discharge Ears: External normal, TM clear bilaterally. Nose: patent, no nasal discharge.  Throat:  No tonsillar erythema or exuda

## 2021-10-15 ENCOUNTER — LAB ENCOUNTER (OUTPATIENT)
Dept: LAB | Age: 56
End: 2021-10-15
Attending: FAMILY MEDICINE
Payer: COMMERCIAL

## 2021-10-15 DIAGNOSIS — R73.09 ALTERED GLUCOSE METABOLISM: ICD-10-CM

## 2021-10-15 PROCEDURE — 83036 HEMOGLOBIN GLYCOSYLATED A1C: CPT

## 2021-10-15 PROCEDURE — 36415 COLL VENOUS BLD VENIPUNCTURE: CPT

## 2021-10-15 PROCEDURE — 80048 BASIC METABOLIC PNL TOTAL CA: CPT

## 2021-10-20 DIAGNOSIS — J45.20 MILD INTERMITTENT ASTHMA WITHOUT COMPLICATION: ICD-10-CM

## 2021-10-20 DIAGNOSIS — I10 ESSENTIAL HYPERTENSION: ICD-10-CM

## 2021-10-20 RX ORDER — LISINOPRIL 2.5 MG/1
2.5 TABLET ORAL DAILY
Qty: 90 TABLET | Refills: 0 | Status: CANCELLED | OUTPATIENT
Start: 2021-10-20

## 2021-10-20 RX ORDER — LISINOPRIL 2.5 MG/1
TABLET ORAL
Qty: 90 TABLET | Refills: 0 | Status: SHIPPED | OUTPATIENT
Start: 2021-10-20

## 2021-10-20 RX ORDER — ATENOLOL AND CHLORTHALIDONE 50; 25 MG/1; MG/1
1 TABLET ORAL DAILY
Qty: 90 TABLET | Refills: 0 | Status: SHIPPED | OUTPATIENT
Start: 2021-10-20

## 2021-10-20 RX ORDER — FLUTICASONE PROPIONATE AND SALMETEROL 250; 50 UG/1; UG/1
POWDER RESPIRATORY (INHALATION)
Qty: 1 EACH | Refills: 3 | Status: SHIPPED | OUTPATIENT
Start: 2021-10-20

## 2021-10-20 NOTE — TELEPHONE ENCOUNTER
A refill request was received for:  Requested Prescriptions     Pending Prescriptions Disp Refills   • LISINOPRIL 2.5 MG Oral Tab [Pharmacy Med Name: Lisinopril Oral Tablet 2.5 MG] 30 tablet 0     Sig: TAKE 1 TABLET BY MOUTH EVERY DAY     Last refill date:

## 2021-10-20 NOTE — TELEPHONE ENCOUNTER
Last refill date: 9/23/21  Qty:90  Last office visit: 10/7/21   When is follow up due: Return in about 6 months (around 4/7/2022) for Blood Pressure Recheck

## 2021-10-21 NOTE — PROGRESS NOTES
Bharat Combs.,    Attached are the results of your recently performed labs/tests: All results are essentially within NORMAL limits. Please: Follow up: With me in 6 months for your hypertension.     Take care,     MARGARET Pierce

## 2021-10-23 ENCOUNTER — IMMUNIZATION (OUTPATIENT)
Dept: LAB | Facility: HOSPITAL | Age: 56
End: 2021-10-23
Attending: EMERGENCY MEDICINE
Payer: COMMERCIAL

## 2021-10-23 DIAGNOSIS — Z23 NEED FOR VACCINATION: Primary | ICD-10-CM

## 2021-10-23 PROCEDURE — 0003A SARSCOV2 VAC 30MCG/0.3ML IM: CPT

## 2022-02-11 RX ORDER — LISINOPRIL 2.5 MG/1
TABLET ORAL
Qty: 90 TABLET | Refills: 0 | Status: SHIPPED | OUTPATIENT
Start: 2022-02-11 | End: 2022-03-13

## 2022-02-25 RX ORDER — FLUTICASONE PROPIONATE AND SALMETEROL 250; 50 UG/1; UG/1
POWDER RESPIRATORY (INHALATION)
Qty: 1 EACH | Refills: 3 | Status: SHIPPED | OUTPATIENT
Start: 2022-02-25

## 2022-03-14 RX ORDER — ATENOLOL AND CHLORTHALIDONE 50; 25 MG/1; MG/1
1 TABLET ORAL DAILY
Qty: 90 TABLET | Refills: 0 | Status: SHIPPED | OUTPATIENT
Start: 2022-03-14

## 2022-03-14 RX ORDER — LISINOPRIL 2.5 MG/1
2.5 TABLET ORAL DAILY
Qty: 90 TABLET | Refills: 0 | Status: SHIPPED | OUTPATIENT
Start: 2022-03-14

## 2022-04-08 RX ORDER — FLUTICASONE PROPIONATE AND SALMETEROL 250; 50 UG/1; UG/1
POWDER RESPIRATORY (INHALATION)
Qty: 1 EACH | Refills: 3 | Status: SHIPPED | OUTPATIENT
Start: 2022-04-08

## 2022-04-18 RX ORDER — ATENOLOL AND CHLORTHALIDONE 50; 25 MG/1; MG/1
1 TABLET ORAL DAILY
Qty: 90 TABLET | Refills: 0 | Status: SHIPPED | OUTPATIENT
Start: 2022-04-18

## 2022-04-18 RX ORDER — LISINOPRIL 2.5 MG/1
2.5 TABLET ORAL DAILY
Qty: 90 TABLET | Refills: 0 | Status: SHIPPED | OUTPATIENT
Start: 2022-04-18

## 2022-05-06 ENCOUNTER — OFFICE VISIT (OUTPATIENT)
Dept: FAMILY MEDICINE CLINIC | Facility: CLINIC | Age: 57
End: 2022-05-06
Payer: COMMERCIAL

## 2022-05-06 VITALS
HEART RATE: 64 BPM | DIASTOLIC BLOOD PRESSURE: 90 MMHG | WEIGHT: 283 LBS | BODY MASS INDEX: 41.92 KG/M2 | HEIGHT: 69 IN | SYSTOLIC BLOOD PRESSURE: 150 MMHG | OXYGEN SATURATION: 98 %

## 2022-05-06 DIAGNOSIS — M54.42 CHRONIC BILATERAL LOW BACK PAIN WITH BILATERAL SCIATICA: ICD-10-CM

## 2022-05-06 DIAGNOSIS — Z00.00 ROUTINE MEDICAL EXAM: ICD-10-CM

## 2022-05-06 DIAGNOSIS — I10 ESSENTIAL HYPERTENSION: Primary | ICD-10-CM

## 2022-05-06 DIAGNOSIS — R17 ELEVATED BILIRUBIN: Chronic | ICD-10-CM

## 2022-05-06 DIAGNOSIS — M54.41 CHRONIC BILATERAL LOW BACK PAIN WITH BILATERAL SCIATICA: ICD-10-CM

## 2022-05-06 DIAGNOSIS — G89.29 CHRONIC BILATERAL LOW BACK PAIN WITH BILATERAL SCIATICA: ICD-10-CM

## 2022-05-06 DIAGNOSIS — J45.20 MILD INTERMITTENT ASTHMA WITHOUT COMPLICATION: ICD-10-CM

## 2022-05-06 DIAGNOSIS — B00.1 HERPES LABIALIS: ICD-10-CM

## 2022-05-06 DIAGNOSIS — Z12.5 PROSTATE CANCER SCREENING: ICD-10-CM

## 2022-05-06 PROCEDURE — 99214 OFFICE O/P EST MOD 30 MIN: CPT | Performed by: FAMILY MEDICINE

## 2022-05-06 PROCEDURE — 3008F BODY MASS INDEX DOCD: CPT | Performed by: FAMILY MEDICINE

## 2022-05-06 PROCEDURE — 3077F SYST BP >= 140 MM HG: CPT | Performed by: FAMILY MEDICINE

## 2022-05-06 PROCEDURE — 3080F DIAST BP >= 90 MM HG: CPT | Performed by: FAMILY MEDICINE

## 2022-05-06 RX ORDER — LISINOPRIL 5 MG/1
5 TABLET ORAL DAILY
Qty: 90 TABLET | Refills: 0 | COMMUNITY
Start: 2022-05-06

## 2022-05-12 DIAGNOSIS — I10 ESSENTIAL HYPERTENSION: ICD-10-CM

## 2022-05-12 RX ORDER — LISINOPRIL 5 MG/1
5 TABLET ORAL DAILY
Qty: 90 TABLET | Refills: 0 | OUTPATIENT
Start: 2022-05-12

## 2022-05-12 RX ORDER — ATENOLOL AND CHLORTHALIDONE TABLET 50; 25 MG/1; MG/1
1 TABLET ORAL DAILY
Qty: 90 TABLET | Refills: 0 | OUTPATIENT
Start: 2022-05-12

## 2022-05-17 DIAGNOSIS — I10 ESSENTIAL HYPERTENSION: ICD-10-CM

## 2022-05-17 RX ORDER — LISINOPRIL 5 MG/1
5 TABLET ORAL DAILY
Qty: 90 TABLET | Refills: 0 | Status: SHIPPED | OUTPATIENT
Start: 2022-05-17

## 2022-06-25 DIAGNOSIS — I10 ESSENTIAL HYPERTENSION: ICD-10-CM

## 2022-06-27 RX ORDER — LISINOPRIL 5 MG/1
5 TABLET ORAL DAILY
Qty: 90 TABLET | Refills: 0 | Status: SHIPPED | OUTPATIENT
Start: 2022-06-27

## 2022-06-27 RX ORDER — ATENOLOL AND CHLORTHALIDONE TABLET 50; 25 MG/1; MG/1
1 TABLET ORAL DAILY
Qty: 90 TABLET | Refills: 0 | Status: SHIPPED | OUTPATIENT
Start: 2022-06-27

## 2022-07-19 DIAGNOSIS — J45.20 MILD INTERMITTENT ASTHMA WITHOUT COMPLICATION: ICD-10-CM

## 2022-07-19 RX ORDER — FLUTICASONE PROPIONATE AND SALMETEROL 250; 50 UG/1; UG/1
POWDER RESPIRATORY (INHALATION)
Qty: 1 EACH | Refills: 3 | Status: SHIPPED | OUTPATIENT
Start: 2022-07-19

## 2022-08-02 DIAGNOSIS — I10 ESSENTIAL HYPERTENSION: ICD-10-CM

## 2022-08-02 RX ORDER — ATENOLOL AND CHLORTHALIDONE TABLET 50; 25 MG/1; MG/1
1 TABLET ORAL DAILY
Qty: 90 TABLET | Refills: 0 | Status: SHIPPED | OUTPATIENT
Start: 2022-08-02

## 2022-08-02 RX ORDER — LISINOPRIL 5 MG/1
5 TABLET ORAL DAILY
Qty: 90 TABLET | Refills: 0 | Status: SHIPPED | OUTPATIENT
Start: 2022-08-02

## 2022-09-06 DIAGNOSIS — I10 ESSENTIAL HYPERTENSION: ICD-10-CM

## 2022-09-06 RX ORDER — ATENOLOL AND CHLORTHALIDONE TABLET 50; 25 MG/1; MG/1
1 TABLET ORAL DAILY
Qty: 90 TABLET | Refills: 0 | Status: SHIPPED | OUTPATIENT
Start: 2022-09-06

## 2022-09-06 RX ORDER — LISINOPRIL 5 MG/1
5 TABLET ORAL DAILY
Qty: 90 TABLET | Refills: 0 | Status: SHIPPED | OUTPATIENT
Start: 2022-09-06

## 2022-09-27 ENCOUNTER — PATIENT MESSAGE (OUTPATIENT)
Dept: FAMILY MEDICINE CLINIC | Facility: CLINIC | Age: 57
End: 2022-09-27

## 2022-09-27 DIAGNOSIS — M54.41 CHRONIC BILATERAL LOW BACK PAIN WITH BILATERAL SCIATICA: Primary | ICD-10-CM

## 2022-09-27 DIAGNOSIS — G89.29 CHRONIC BILATERAL LOW BACK PAIN WITH BILATERAL SCIATICA: Primary | ICD-10-CM

## 2022-09-27 DIAGNOSIS — M54.42 CHRONIC BILATERAL LOW BACK PAIN WITH BILATERAL SCIATICA: Primary | ICD-10-CM

## 2022-09-27 NOTE — TELEPHONE ENCOUNTER
From: Milagros Carrasco. To: Carmita Castellon MD  Sent: 9/27/2022 11:43 AM CDT  Subject: PT Script    Can you please provides a script to see   [facility] for my back pain? They have my Insurance info but need a script from my primary. [facility]  [address], [address] [facility]  [phone]    I went in yesterday for my first visit. It is already working! Thank you.   [place] Sprain

## 2022-10-04 DIAGNOSIS — I10 ESSENTIAL HYPERTENSION: ICD-10-CM

## 2022-10-04 RX ORDER — ATENOLOL AND CHLORTHALIDONE TABLET 50; 25 MG/1; MG/1
1 TABLET ORAL DAILY
Qty: 90 TABLET | Refills: 0 | Status: SHIPPED | OUTPATIENT
Start: 2022-10-04

## 2022-10-04 RX ORDER — LISINOPRIL 5 MG/1
5 TABLET ORAL DAILY
Qty: 90 TABLET | Refills: 0 | Status: SHIPPED | OUTPATIENT
Start: 2022-10-04

## 2022-11-04 ENCOUNTER — TELEPHONE (OUTPATIENT)
Dept: FAMILY MEDICINE CLINIC | Facility: CLINIC | Age: 57
End: 2022-11-04

## 2022-11-04 DIAGNOSIS — I10 ESSENTIAL HYPERTENSION: ICD-10-CM

## 2022-11-04 DIAGNOSIS — J45.20 MILD INTERMITTENT ASTHMA WITHOUT COMPLICATION: ICD-10-CM

## 2022-11-04 RX ORDER — ATENOLOL AND CHLORTHALIDONE TABLET 50; 25 MG/1; MG/1
1 TABLET ORAL DAILY
Qty: 90 TABLET | Refills: 0 | OUTPATIENT
Start: 2022-11-04

## 2022-11-04 RX ORDER — LISINOPRIL 5 MG/1
5 TABLET ORAL DAILY
Qty: 90 TABLET | Refills: 0 | OUTPATIENT
Start: 2022-11-04

## 2022-11-04 RX ORDER — FLUTICASONE PROPIONATE AND SALMETEROL 250; 50 UG/1; UG/1
POWDER RESPIRATORY (INHALATION)
Qty: 1 EACH | Refills: 3 | OUTPATIENT
Start: 2022-11-04

## 2022-11-07 RX ORDER — ATENOLOL AND CHLORTHALIDONE TABLET 50; 25 MG/1; MG/1
1 TABLET ORAL DAILY
Qty: 90 TABLET | Refills: 0 | Status: SHIPPED | OUTPATIENT
Start: 2022-11-07

## 2022-11-07 RX ORDER — LISINOPRIL 5 MG/1
5 TABLET ORAL DAILY
Qty: 90 TABLET | Refills: 0 | Status: SHIPPED | OUTPATIENT
Start: 2022-11-07

## 2022-11-07 RX ORDER — FLUTICASONE PROPIONATE AND SALMETEROL 250; 50 UG/1; UG/1
POWDER RESPIRATORY (INHALATION)
Qty: 1 EACH | Refills: 2 | Status: SHIPPED | OUTPATIENT
Start: 2022-11-07

## 2022-11-09 ENCOUNTER — TELEMEDICINE (OUTPATIENT)
Dept: FAMILY MEDICINE CLINIC | Facility: CLINIC | Age: 57
End: 2022-11-09
Payer: COMMERCIAL

## 2022-11-09 DIAGNOSIS — M54.42 CHRONIC BILATERAL LOW BACK PAIN WITH BILATERAL SCIATICA: ICD-10-CM

## 2022-11-09 DIAGNOSIS — I10 ESSENTIAL HYPERTENSION: Primary | ICD-10-CM

## 2022-11-09 DIAGNOSIS — J45.20 MILD INTERMITTENT ASTHMA WITHOUT COMPLICATION: ICD-10-CM

## 2022-11-09 DIAGNOSIS — G89.29 CHRONIC BILATERAL LOW BACK PAIN WITH BILATERAL SCIATICA: ICD-10-CM

## 2022-11-09 DIAGNOSIS — M54.41 CHRONIC BILATERAL LOW BACK PAIN WITH BILATERAL SCIATICA: ICD-10-CM

## 2022-11-09 PROCEDURE — 99214 OFFICE O/P EST MOD 30 MIN: CPT | Performed by: NURSE PRACTITIONER

## 2022-11-09 RX ORDER — ALBUTEROL SULFATE 90 UG/1
2 AEROSOL, METERED RESPIRATORY (INHALATION) EVERY 6 HOURS PRN
Qty: 8.5 G | Refills: 0 | Status: SHIPPED | OUTPATIENT
Start: 2022-11-09

## 2022-12-14 DIAGNOSIS — I10 ESSENTIAL HYPERTENSION: ICD-10-CM

## 2022-12-14 RX ORDER — ATENOLOL AND CHLORTHALIDONE TABLET 50; 25 MG/1; MG/1
1 TABLET ORAL DAILY
Qty: 90 TABLET | Refills: 0 | Status: SHIPPED | OUTPATIENT
Start: 2022-12-14

## 2022-12-14 RX ORDER — LISINOPRIL 5 MG/1
5 TABLET ORAL DAILY
Qty: 90 TABLET | Refills: 0 | Status: SHIPPED | OUTPATIENT
Start: 2022-12-14

## 2022-12-16 ENCOUNTER — LAB ENCOUNTER (OUTPATIENT)
Dept: LAB | Age: 57
End: 2022-12-16
Attending: FAMILY MEDICINE
Payer: COMMERCIAL

## 2022-12-16 LAB
ALBUMIN SERPL-MCNC: 3.9 G/DL (ref 3.4–5)
ALBUMIN/GLOB SERPL: 1.3 {RATIO} (ref 1–2)
ALP LIVER SERPL-CCNC: 61 U/L
ALT SERPL-CCNC: 34 U/L
ANION GAP SERPL CALC-SCNC: 7 MMOL/L (ref 0–18)
AST SERPL-CCNC: 29 U/L (ref 15–37)
BASOPHILS # BLD AUTO: 0.06 X10(3) UL (ref 0–0.2)
BASOPHILS NFR BLD AUTO: 0.6 %
BILIRUB SERPL-MCNC: 2.2 MG/DL (ref 0.1–2)
BUN BLD-MCNC: 18 MG/DL (ref 7–18)
CALCIUM BLD-MCNC: 9.2 MG/DL (ref 8.5–10.1)
CHLORIDE SERPL-SCNC: 105 MMOL/L (ref 98–112)
CHOLEST SERPL-MCNC: 166 MG/DL (ref ?–200)
CO2 SERPL-SCNC: 30 MMOL/L (ref 21–32)
CREAT BLD-MCNC: 0.84 MG/DL
EOSINOPHIL # BLD AUTO: 0.24 X10(3) UL (ref 0–0.7)
EOSINOPHIL NFR BLD AUTO: 2.4 %
ERYTHROCYTE [DISTWIDTH] IN BLOOD BY AUTOMATED COUNT: 12.7 %
FASTING PATIENT LIPID ANSWER: YES
FASTING STATUS PATIENT QL REPORTED: YES
GFR SERPLBLD BASED ON 1.73 SQ M-ARVRAT: 103 ML/MIN/1.73M2 (ref 60–?)
GLOBULIN PLAS-MCNC: 2.9 G/DL (ref 2.8–4.4)
GLUCOSE BLD-MCNC: 99 MG/DL (ref 70–99)
HCT VFR BLD AUTO: 49.2 %
HDLC SERPL-MCNC: 56 MG/DL (ref 40–59)
HGB BLD-MCNC: 16.4 G/DL
IMM GRANULOCYTES # BLD AUTO: 0.05 X10(3) UL (ref 0–1)
IMM GRANULOCYTES NFR BLD: 0.5 %
LDLC SERPL CALC-MCNC: 92 MG/DL (ref ?–100)
LYMPHOCYTES # BLD AUTO: 2.26 X10(3) UL (ref 1–4)
LYMPHOCYTES NFR BLD AUTO: 22.3 %
MCH RBC QN AUTO: 31.2 PG (ref 26–34)
MCHC RBC AUTO-ENTMCNC: 33.3 G/DL (ref 31–37)
MCV RBC AUTO: 93.7 FL
MONOCYTES # BLD AUTO: 0.6 X10(3) UL (ref 0.1–1)
MONOCYTES NFR BLD AUTO: 5.9 %
NEUTROPHILS # BLD AUTO: 6.94 X10 (3) UL (ref 1.5–7.7)
NEUTROPHILS # BLD AUTO: 6.94 X10(3) UL (ref 1.5–7.7)
NEUTROPHILS NFR BLD AUTO: 68.3 %
NONHDLC SERPL-MCNC: 110 MG/DL (ref ?–130)
OSMOLALITY SERPL CALC.SUM OF ELEC: 296 MOSM/KG (ref 275–295)
PLATELET # BLD AUTO: 276 10(3)UL (ref 150–450)
POTASSIUM SERPL-SCNC: 3.7 MMOL/L (ref 3.5–5.1)
PROT SERPL-MCNC: 6.8 G/DL (ref 6.4–8.2)
PSA SERPL-MCNC: 0.42 NG/ML (ref ?–4)
RBC # BLD AUTO: 5.25 X10(6)UL
SODIUM SERPL-SCNC: 142 MMOL/L (ref 136–145)
TRIGL SERPL-MCNC: 98 MG/DL (ref 30–149)
VLDLC SERPL CALC-MCNC: 16 MG/DL (ref 0–30)
WBC # BLD AUTO: 10.2 X10(3) UL (ref 4–11)

## 2022-12-16 PROCEDURE — 84153 ASSAY OF PSA TOTAL: CPT | Performed by: FAMILY MEDICINE

## 2022-12-16 PROCEDURE — 36415 COLL VENOUS BLD VENIPUNCTURE: CPT | Performed by: FAMILY MEDICINE

## 2022-12-16 PROCEDURE — 85025 COMPLETE CBC W/AUTO DIFF WBC: CPT | Performed by: FAMILY MEDICINE

## 2022-12-16 PROCEDURE — 80053 COMPREHEN METABOLIC PANEL: CPT | Performed by: FAMILY MEDICINE

## 2022-12-16 PROCEDURE — 80061 LIPID PANEL: CPT | Performed by: FAMILY MEDICINE

## 2023-01-13 DIAGNOSIS — J45.20 MILD INTERMITTENT ASTHMA WITHOUT COMPLICATION: ICD-10-CM

## 2023-01-13 DIAGNOSIS — I10 ESSENTIAL HYPERTENSION: ICD-10-CM

## 2023-01-19 RX ORDER — ATENOLOL AND CHLORTHALIDONE TABLET 50; 25 MG/1; MG/1
1 TABLET ORAL DAILY
Qty: 90 TABLET | Refills: 0 | Status: SHIPPED | OUTPATIENT
Start: 2023-01-19

## 2023-01-19 RX ORDER — ALBUTEROL SULFATE 90 UG/1
2 AEROSOL, METERED RESPIRATORY (INHALATION) EVERY 6 HOURS PRN
Qty: 8.5 G | Refills: 0 | Status: SHIPPED | OUTPATIENT
Start: 2023-01-19

## 2023-01-19 RX ORDER — LISINOPRIL 5 MG/1
5 TABLET ORAL DAILY
Qty: 90 TABLET | Refills: 0 | Status: SHIPPED | OUTPATIENT
Start: 2023-01-19

## 2023-01-30 ENCOUNTER — PATIENT MESSAGE (OUTPATIENT)
Dept: FAMILY MEDICINE CLINIC | Facility: CLINIC | Age: 58
End: 2023-01-30

## 2023-01-30 DIAGNOSIS — J45.20 MILD INTERMITTENT ASTHMA WITHOUT COMPLICATION: Primary | ICD-10-CM

## 2023-01-30 RX ORDER — FLUTICASONE PROPIONATE AND SALMETEROL 250; 50 UG/1; UG/1
1 POWDER RESPIRATORY (INHALATION) EVERY 12 HOURS SCHEDULED
COMMUNITY
End: 2023-01-30

## 2023-01-30 RX ORDER — FLUTICASONE PROPIONATE AND SALMETEROL 250; 50 UG/1; UG/1
1 POWDER RESPIRATORY (INHALATION) EVERY 12 HOURS SCHEDULED
Qty: 1 EACH | Refills: 1 | Status: SHIPPED | OUTPATIENT
Start: 2023-01-30

## 2023-03-23 DIAGNOSIS — J45.20 MILD INTERMITTENT ASTHMA WITHOUT COMPLICATION: ICD-10-CM

## 2023-03-23 RX ORDER — FLUTICASONE PROPIONATE AND SALMETEROL 250; 50 UG/1; UG/1
1 POWDER RESPIRATORY (INHALATION) EVERY 12 HOURS SCHEDULED
Qty: 1 EACH | Refills: 1 | Status: SHIPPED | OUTPATIENT
Start: 2023-03-23

## 2023-04-24 DIAGNOSIS — I10 ESSENTIAL HYPERTENSION: ICD-10-CM

## 2023-04-24 NOTE — TELEPHONE ENCOUNTER
A refill request was received for:  Requested Prescriptions     Pending Prescriptions Disp Refills    atenolol-chlorthalidone 50-25 MG Oral Tab 90 tablet 0     Sig: Take 1 tablet by mouth daily. lisinopril 5 MG Oral Tab 90 tablet 0     Sig: Take 1 tablet (5 mg total) by mouth daily. Last refill date:  03/22/2023  Qty: 27, 30  Dx: essential hypertension   Last office visit: 11/09/2022  When is follow up due: 6 months 04/09/2023    No future appointments.

## 2023-04-26 RX ORDER — LISINOPRIL 5 MG/1
5 TABLET ORAL DAILY
Qty: 90 TABLET | Refills: 0 | Status: SHIPPED | OUTPATIENT
Start: 2023-04-26

## 2023-04-26 RX ORDER — ATENOLOL AND CHLORTHALIDONE TABLET 50; 25 MG/1; MG/1
1 TABLET ORAL DAILY
Qty: 90 TABLET | Refills: 0 | Status: SHIPPED | OUTPATIENT
Start: 2023-04-26

## 2023-05-04 ENCOUNTER — TELEMEDICINE (OUTPATIENT)
Dept: FAMILY MEDICINE CLINIC | Facility: CLINIC | Age: 58
End: 2023-05-04
Payer: COMMERCIAL

## 2023-05-04 DIAGNOSIS — I10 ESSENTIAL HYPERTENSION: Primary | ICD-10-CM

## 2023-05-04 DIAGNOSIS — J45.20 MILD INTERMITTENT ASTHMA WITHOUT COMPLICATION: ICD-10-CM

## 2023-05-04 PROCEDURE — 99213 OFFICE O/P EST LOW 20 MIN: CPT | Performed by: NURSE PRACTITIONER

## 2023-05-26 ENCOUNTER — PATIENT MESSAGE (OUTPATIENT)
Dept: FAMILY MEDICINE CLINIC | Facility: CLINIC | Age: 58
End: 2023-05-26

## 2023-05-30 DIAGNOSIS — I10 ESSENTIAL HYPERTENSION: ICD-10-CM

## 2023-05-30 DIAGNOSIS — J45.20 MILD INTERMITTENT ASTHMA WITHOUT COMPLICATION: ICD-10-CM

## 2023-05-30 RX ORDER — ATENOLOL AND CHLORTHALIDONE TABLET 50; 25 MG/1; MG/1
1 TABLET ORAL DAILY
Qty: 90 TABLET | Refills: 0 | Status: SHIPPED | OUTPATIENT
Start: 2023-05-30

## 2023-05-30 RX ORDER — FLUTICASONE PROPIONATE AND SALMETEROL 250; 50 UG/1; UG/1
1 POWDER RESPIRATORY (INHALATION) EVERY 12 HOURS SCHEDULED
Qty: 1 EACH | Refills: 1 | Status: CANCELLED | OUTPATIENT
Start: 2023-05-30

## 2023-05-30 RX ORDER — LISINOPRIL 5 MG/1
5 TABLET ORAL DAILY
Qty: 90 TABLET | Refills: 0 | Status: SHIPPED | OUTPATIENT
Start: 2023-05-30

## 2023-05-30 RX ORDER — FLUTICASONE PROPIONATE AND SALMETEROL 113; 14 UG/1; UG/1
1 POWDER, METERED RESPIRATORY (INHALATION) 2 TIMES DAILY
Qty: 1 EACH | Refills: 2 | Status: SHIPPED | OUTPATIENT
Start: 2023-05-30

## 2023-05-30 NOTE — TELEPHONE ENCOUNTER
A refill request was received for:  Requested Prescriptions     Pending Prescriptions Disp Refills    fluticasone-salmeterol 250-50 MCG/ACT Inhalation Aerosol Powder, Breath Activated 1 each 1     Sig: Inhale 1 puff into the lungs every 12 (twelve) hours. Signed Prescriptions Disp Refills    atenolol-chlorthalidone 50-25 MG Oral Tab 90 tablet 0     Sig: Take 1 tablet by mouth daily. Authorizing Provider: Debi Joshi     Ordering User: Terra Mckee    lisinopril 5 MG Oral Tab 90 tablet 0     Sig: Take 1 tablet (5 mg total) by mouth daily.      Authorizing Provider: Debi Joshi     Ordering User: Terra Mckee     Last refill date:  03/23/2023  Qty: 1  Dx: mild to intermittent asthma without complication   Last office visit: 05/04/2023   When is follow up due: 08/04/2023 3 months per last OV note     For hormone prescriptions, date of last blood test for rx being requested:    Future Appointments   Date Time Provider Govind Villarreal   6/1/2023  3:30 PM ALEJANDRO Andrea [facility]   8/10/2023  8:30 AM ALEJANDRO Andrea [facility]

## 2023-05-30 NOTE — TELEPHONE ENCOUNTER
S/w Christi Martinez      Hx of sx: Pt stated has left buttock pain 2 years ago. Pt evaluated by Chiropractor who informed the pt had a herniated discs. Pt was referred to PT by Dr. Yelitza Garrison 8/25/22. Pt completed 12/22. Pt denied known injury. Sx: Pt has left buttock 6 (sitting)-10 (standing)/10 pain with left leg numbness constant for 1 year. Pt has difficulty walking left leg. Recommendation. Evaluation this week. Pt declined sooner appt with either Susannah ALLEN or Dr. Lucas Shepherd.

## 2023-06-01 ENCOUNTER — OFFICE VISIT (OUTPATIENT)
Dept: FAMILY MEDICINE CLINIC | Facility: CLINIC | Age: 58
End: 2023-06-01
Payer: COMMERCIAL

## 2023-06-01 VITALS
OXYGEN SATURATION: 97 % | HEIGHT: 70 IN | WEIGHT: 301 LBS | DIASTOLIC BLOOD PRESSURE: 72 MMHG | TEMPERATURE: 98 F | BODY MASS INDEX: 43.09 KG/M2 | RESPIRATION RATE: 20 BRPM | SYSTOLIC BLOOD PRESSURE: 128 MMHG | HEART RATE: 81 BPM

## 2023-06-01 DIAGNOSIS — M54.42 CHRONIC BILATERAL LOW BACK PAIN WITH BILATERAL SCIATICA: Primary | ICD-10-CM

## 2023-06-01 DIAGNOSIS — M54.41 CHRONIC BILATERAL LOW BACK PAIN WITH BILATERAL SCIATICA: Primary | ICD-10-CM

## 2023-06-01 DIAGNOSIS — I10 ESSENTIAL HYPERTENSION: ICD-10-CM

## 2023-06-01 DIAGNOSIS — J45.20 MILD INTERMITTENT ASTHMA WITHOUT COMPLICATION: ICD-10-CM

## 2023-06-01 DIAGNOSIS — G89.29 CHRONIC BILATERAL LOW BACK PAIN WITH BILATERAL SCIATICA: Primary | ICD-10-CM

## 2023-06-01 PROBLEM — Z12.11 SPECIAL SCREENING FOR MALIGNANT NEOPLASM OF COLON: Status: RESOLVED | Noted: 2019-01-31 | Resolved: 2023-06-01

## 2023-06-01 PROBLEM — R17 ELEVATED BILIRUBIN: Chronic | Status: RESOLVED | Noted: 2017-05-25 | Resolved: 2023-06-01

## 2023-06-01 PROCEDURE — 3008F BODY MASS INDEX DOCD: CPT | Performed by: NURSE PRACTITIONER

## 2023-06-01 PROCEDURE — 3074F SYST BP LT 130 MM HG: CPT | Performed by: NURSE PRACTITIONER

## 2023-06-01 PROCEDURE — 3078F DIAST BP <80 MM HG: CPT | Performed by: NURSE PRACTITIONER

## 2023-06-01 PROCEDURE — 99213 OFFICE O/P EST LOW 20 MIN: CPT | Performed by: NURSE PRACTITIONER

## 2023-06-02 ENCOUNTER — MED REC SCAN ONLY (OUTPATIENT)
Dept: FAMILY MEDICINE CLINIC | Facility: CLINIC | Age: 58
End: 2023-06-02

## 2023-06-10 DIAGNOSIS — R93.7 ABNORMAL MRI, SPINE: Primary | ICD-10-CM

## 2023-07-18 ENCOUNTER — PATIENT MESSAGE (OUTPATIENT)
Dept: FAMILY MEDICINE CLINIC | Facility: CLINIC | Age: 58
End: 2023-07-18

## 2023-07-18 DIAGNOSIS — B00.1 HERPES LABIALIS: ICD-10-CM

## 2023-07-18 RX ORDER — VALACYCLOVIR HYDROCHLORIDE 1 G/1
TABLET, FILM COATED ORAL
Qty: 21 TABLET | Refills: 0 | Status: SHIPPED | OUTPATIENT
Start: 2023-07-18

## 2023-07-18 NOTE — TELEPHONE ENCOUNTER
From: Oralia Pineda. To: ALEJANDRO Blankenship  Sent: 7/18/2023 7:34 AM CDT  Subject: Script Request    Can you please refill the generic Valtrex script? I was out in the sun all day on Sunday and now have a fever blister. I am now out of pills to get rid of this thing. Any help would be greatly appreciated! Thank you.   Randa Loya

## 2023-07-18 NOTE — TELEPHONE ENCOUNTER
S/w Raad Elam         Hx of sx: Pt  stated ValAcyclovir 1 gram when receives cold sores by Dr. Quyen Angulo 1 year ago. Pt had 3 tablets left with taking 2 last night and 1 in the AM.           Sx: Pt has currently has 1 oral cold sore. Pt denied fever or any problems eating or drinking. Pt is requesting  Rx for ValAcyclovir . Please advise.

## 2023-08-07 DIAGNOSIS — I10 ESSENTIAL HYPERTENSION: ICD-10-CM

## 2023-08-07 RX ORDER — ATENOLOL AND CHLORTHALIDONE TABLET 50; 25 MG/1; MG/1
1 TABLET ORAL DAILY
Qty: 90 TABLET | Refills: 0 | Status: SHIPPED | OUTPATIENT
Start: 2023-08-07

## 2023-08-07 RX ORDER — LISINOPRIL 5 MG/1
5 TABLET ORAL DAILY
Qty: 90 TABLET | Refills: 0 | Status: SHIPPED | OUTPATIENT
Start: 2023-08-07

## 2023-08-14 ENCOUNTER — PATIENT MESSAGE (OUTPATIENT)
Dept: FAMILY MEDICINE CLINIC | Facility: CLINIC | Age: 58
End: 2023-08-14

## 2023-08-15 NOTE — TELEPHONE ENCOUNTER
From: Apple Anderson. To: ALEJANDRO Maurice  Sent: 8/14/2023 11:45 AM CDT  Subject: Upcoming Appt    Do I need to fast for my appointment on 8/19? Are we doing any bloodwork?

## 2023-08-19 ENCOUNTER — OFFICE VISIT (OUTPATIENT)
Dept: FAMILY MEDICINE CLINIC | Facility: CLINIC | Age: 58
End: 2023-08-19
Payer: COMMERCIAL

## 2023-08-19 VITALS
RESPIRATION RATE: 18 BRPM | TEMPERATURE: 97 F | SYSTOLIC BLOOD PRESSURE: 116 MMHG | WEIGHT: 294.63 LBS | HEIGHT: 69 IN | BODY MASS INDEX: 43.64 KG/M2 | OXYGEN SATURATION: 96 % | DIASTOLIC BLOOD PRESSURE: 80 MMHG | HEART RATE: 83 BPM

## 2023-08-19 DIAGNOSIS — I10 ESSENTIAL HYPERTENSION: ICD-10-CM

## 2023-08-19 DIAGNOSIS — J45.20 MILD INTERMITTENT ASTHMA WITHOUT COMPLICATION: ICD-10-CM

## 2023-08-19 DIAGNOSIS — Z12.5 SCREENING FOR MALIGNANT NEOPLASM OF PROSTATE: ICD-10-CM

## 2023-08-19 DIAGNOSIS — M54.41 CHRONIC BILATERAL LOW BACK PAIN WITH BILATERAL SCIATICA: ICD-10-CM

## 2023-08-19 DIAGNOSIS — Z00.00 ADULT GENERAL MEDICAL EXAMINATION: Primary | ICD-10-CM

## 2023-08-19 DIAGNOSIS — Z12.11 SCREENING FOR MALIGNANT NEOPLASM OF COLON: ICD-10-CM

## 2023-08-19 DIAGNOSIS — D12.3 BENIGN NEOPLASM OF TRANSVERSE COLON: ICD-10-CM

## 2023-08-19 DIAGNOSIS — H81.09 MENIERE'S DISEASE, UNSPECIFIED LATERALITY: ICD-10-CM

## 2023-08-19 DIAGNOSIS — M54.42 CHRONIC BILATERAL LOW BACK PAIN WITH BILATERAL SCIATICA: ICD-10-CM

## 2023-08-19 DIAGNOSIS — B00.1 HERPES LABIALIS: ICD-10-CM

## 2023-08-19 DIAGNOSIS — Z80.0 FAMILY HISTORY OF MALIGNANT NEOPLASM OF DIGESTIVE ORGAN: ICD-10-CM

## 2023-08-19 DIAGNOSIS — G89.29 CHRONIC BILATERAL LOW BACK PAIN WITH BILATERAL SCIATICA: ICD-10-CM

## 2023-08-19 RX ORDER — AMOXICILLIN 500 MG/1
500 TABLET, FILM COATED ORAL 3 TIMES DAILY
COMMUNITY
Start: 2023-08-12

## 2023-09-05 LAB
ABSOLUTE BASOPHILS: 41 CELLS/UL (ref 0–200)
ABSOLUTE EOSINOPHILS: 73 CELLS/UL (ref 15–500)
ABSOLUTE LYMPHOCYTES: 1539 CELLS/UL (ref 850–3900)
ABSOLUTE MONOCYTES: 535 CELLS/UL (ref 200–950)
ABSOLUTE NEUTROPHILS: 5913 CELLS/UL (ref 1500–7800)
ALBUMIN/GLOBULIN RATIO: 1.8 (CALC) (ref 1–2.5)
ALBUMIN: 4.4 G/DL (ref 3.6–5.1)
ALKALINE PHOSPHATASE: 68 U/L (ref 35–144)
ALT: 23 U/L (ref 9–46)
AST: 25 U/L (ref 10–35)
BASOPHILS: 0.5 %
BILIRUBIN, TOTAL: 2.3 MG/DL (ref 0.2–1.2)
BUN: 17 MG/DL (ref 7–25)
CALCIUM: 9.7 MG/DL (ref 8.6–10.3)
CARBON DIOXIDE: 31 MMOL/L (ref 20–32)
CHLORIDE: 102 MMOL/L (ref 98–110)
CHOL/HDLC RATIO: 3.3 (CALC)
CHOLESTEROL, TOTAL: 152 MG/DL
CREATININE: 0.87 MG/DL (ref 0.7–1.3)
EGFR: 102 ML/MIN/1.73M2
EOSINOPHILS: 0.9 %
GLOBULIN: 2.4 G/DL (CALC) (ref 1.9–3.7)
GLUCOSE: 94 MG/DL (ref 65–99)
HDL CHOLESTEROL: 46 MG/DL
HEMATOCRIT: 46.3 % (ref 38.5–50)
HEMOGLOBIN A1C: 5 % OF TOTAL HGB
HEMOGLOBIN: 15.9 G/DL (ref 13.2–17.1)
LDL-CHOLESTEROL: 87 MG/DL (CALC)
LYMPHOCYTES: 19 %
MCH: 31.4 PG (ref 27–33)
MCHC: 34.3 G/DL (ref 32–36)
MCV: 91.5 FL (ref 80–100)
MONOCYTES: 6.6 %
MPV: 9.8 FL (ref 7.5–12.5)
NEUTROPHILS: 73 %
NON-HDL CHOLESTEROL: 106 MG/DL (CALC)
PLATELET COUNT: 272 THOUSAND/UL (ref 140–400)
POTASSIUM: 4 MMOL/L (ref 3.5–5.3)
PROTEIN, TOTAL: 6.8 G/DL (ref 6.1–8.1)
RDW: 12.1 % (ref 11–15)
RED BLOOD CELL COUNT: 5.06 MILLION/UL (ref 4.2–5.8)
SODIUM: 142 MMOL/L (ref 135–146)
TOTAL PSA: 0.3 NG/ML
TRIGLYCERIDES: 94 MG/DL
TSH: 1.63 MIU/L (ref 0.4–4.5)
WHITE BLOOD CELL COUNT: 8.1 THOUSAND/UL (ref 3.8–10.8)

## 2023-09-06 DIAGNOSIS — I10 ESSENTIAL HYPERTENSION: ICD-10-CM

## 2023-09-06 DIAGNOSIS — R17 ELEVATED BILIRUBIN: Primary | ICD-10-CM

## 2023-09-06 RX ORDER — ATENOLOL AND CHLORTHALIDONE TABLET 50; 25 MG/1; MG/1
1 TABLET ORAL DAILY
Qty: 90 TABLET | Refills: 0 | Status: SHIPPED | OUTPATIENT
Start: 2023-09-06

## 2023-09-06 RX ORDER — LISINOPRIL 5 MG/1
5 TABLET ORAL DAILY
Qty: 90 TABLET | Refills: 0 | Status: SHIPPED | OUTPATIENT
Start: 2023-09-06

## 2023-09-08 ENCOUNTER — HOSPITAL ENCOUNTER (OUTPATIENT)
Dept: GENERAL RADIOLOGY | Age: 58
Discharge: HOME | End: 2023-09-08
Attending: NURSE PRACTITIONER
Payer: COMMERCIAL

## 2023-09-08 DIAGNOSIS — R93.7 ABNORMAL MRI, SPINE: ICD-10-CM

## 2023-09-08 PROCEDURE — 72110 X-RAY EXAM L-2 SPINE 4/>VWS: CPT | Performed by: NURSE PRACTITIONER

## 2023-10-13 ENCOUNTER — HOSPITAL ENCOUNTER (OUTPATIENT)
Dept: ULTRASOUND IMAGING | Age: 58
Discharge: HOME | End: 2023-10-13
Attending: NURSE PRACTITIONER
Payer: COMMERCIAL

## 2023-10-13 DIAGNOSIS — R17 ELEVATED BILIRUBIN: ICD-10-CM

## 2023-10-13 PROCEDURE — 76700 US EXAM ABDOM COMPLETE: CPT | Performed by: NURSE PRACTITIONER

## 2023-10-20 DIAGNOSIS — I10 ESSENTIAL HYPERTENSION: ICD-10-CM

## 2023-10-23 RX ORDER — LISINOPRIL 5 MG/1
5 TABLET ORAL DAILY
Qty: 90 TABLET | Refills: 0 | OUTPATIENT
Start: 2023-10-23

## 2023-10-23 RX ORDER — ATENOLOL AND CHLORTHALIDONE TABLET 50; 25 MG/1; MG/1
1 TABLET ORAL DAILY
Qty: 90 TABLET | Refills: 0 | OUTPATIENT
Start: 2023-10-23

## 2023-10-25 ENCOUNTER — TELEMEDICINE (OUTPATIENT)
Dept: FAMILY MEDICINE CLINIC | Facility: CLINIC | Age: 58
End: 2023-10-25

## 2023-10-25 ENCOUNTER — PATIENT MESSAGE (OUTPATIENT)
Dept: FAMILY MEDICINE CLINIC | Facility: CLINIC | Age: 58
End: 2023-10-25

## 2023-10-25 DIAGNOSIS — Z78.9 ALCOHOL USE: Primary | ICD-10-CM

## 2023-10-25 DIAGNOSIS — I10 ESSENTIAL HYPERTENSION: ICD-10-CM

## 2023-10-25 DIAGNOSIS — K76.0 FATTY LIVER: ICD-10-CM

## 2023-10-25 PROCEDURE — 99213 OFFICE O/P EST LOW 20 MIN: CPT | Performed by: NURSE PRACTITIONER

## 2023-10-25 NOTE — PROGRESS NOTES
Due to the real risk of possible exposure to Coronavirus (CoV-2, COVID-19) in the office/medical building and recommendations for social distancing (key to mitigation/limiting the spread of the virus) a Virtual or Telemedicine visit over the phone was performed as below. Patient has consented to the Virtual/Telephone Check-In service and expresses understanding and accepts financial responsibility for any deductible, co-insurance and/or co-pays associated with this service. Telehealth outside of [address] Verbal Consent   I conducted a telehealth visit with Sara Singh. today, 10/25/23, which was completed using two-way, real-time interactive audio and video communication. This has been done in good collin to provide continuity of care in the best interest of the provider-patient relationship, due to the COVID -19 public health crisis/national emergency where restrictions of face-to-face office visits are ongoing. Every conscious effort was taken to allow for sufficient and adequate time to complete the visit. The patient was made aware of the limitations of the telehealth visit, including treatment limitations as no physical exam could be performed. The patient was advised to call 911 or to go to the ER in case there was an emergency. The patient was also advised of the potential privacy & security concerns related to the telehealth platform. The patient was made aware of where to find [facility] notice of privacy practices, telehealth consent form and other related consent forms and documents. which are located on the [facility] website. The patient verbally agreed to telehealth consent form, related consents and the risks discussed. Lastly, the patient confirmed that they were in [place]. Included in this visit, time may have been spent reviewing labs, medications, radiology tests and decision making.  Appropriate medical decision-making and tests are ordered as detailed in the plan of care above. Coding/billing information is submitted for this visit based on complexity of care and/or time spent for the visit. HPI:  Patient presents with: Follow - Up      Tracey Stewart. is a 56year old male who calls for complaints of:    Requests help stopping drinking alcohol. Has drank alcohol for years, was able to quit on his own in the past but restarted. He does not drink during the week, but drinks on the weekends with the goal of \"getting drunk\" on Fridays. Has a hangover the next day. Used to drink on Saturdays as well but stopped after he had his grandson. Has attended AA in the past a few times. Interested in a medication to help with keeping him sober. Thinks this is largely behavioral/social.     ROS:  GEN: Denies fever, chills, fatigue. CARDIOVASCULAR: Denies chest pain, dyspnea. RESPIRATORY: Denies cough, dyspnea. NEURO: Denies headaches, dizziness. Physical Exam:  GEN:  Patient is alert, awake and oriented, well developed, well nourished. LUNGS: Patient speaks clearly in full sentences without dyspnea, no cough while on video visit. PSYCH: Appropriate mood and affect. No Known Allergies  Current Outpatient Medications   Medication Sig Dispense Refill    PEG 3350-KCl-Na Bicarb-NaCl 420 g Oral Recon Soln Take as directed by physician 4000 mL 0    atenolol-chlorthalidone 50-25 MG Oral Tab Take 1 tablet by mouth daily. 90 tablet 0    lisinopril 5 MG Oral Tab Take 1 tablet (5 mg total) by mouth daily. 90 tablet 0    valACYclovir 1 G Oral Tab Take 1 tablet BID x 1-2 days PRN for cold sore 21 tablet 0    albuterol (PROAIR HFA) 108 (90 Base) MCG/ACT Inhalation Aero Soln Inhale 2 puffs into the lungs every 6 (six) hours as needed for Wheezing.  8.5 g 0     Past Medical History:   Diagnosis Date    Asthma     Back pain 2012    nothing painful, but it is there    Diarrhea, unspecified Various    Have a few times during year    Elevated bilirubin 5/25/2017    Heartburn 2010    3-5 times per year    Jaundice 1968    Had it when I was a child. Seasonal allergies     Wears glasses 1990    No issue, just need to use glasses     No past surgical history on file. ASSESSMENT AND PLAN:   1. Patient is a 56year old male who calls for: Alcohol use    Additional Assessment and Plan:  1. Alcohol use  -Referral placed for psychiatry to discuss Antabuse. Also referred to counseling. Discussed alternative strategies instead of drinking alcohol for coping/reward. 2. Essential hypertension  -BP stable. Can request refills when needed. 3. Fatty liver  -Recommended to speak with psychiatry as above. Follow up with me 3 months    Call and/or go to the ER if worsening symptoms including, but not limited to: respiratory distress, shortness of breath and  wheezing, worsening fever, cough and mental status. The patient (or patient's parent if <17 y/o) indicates understanding of the above recommendations and agrees to the above plan. No orders of the defined types were placed in this encounter.       Meds & Refills for this Visit:  Requested Prescriptions      No prescriptions requested or ordered in this encounter       Imaging & Consults:  None    ALEJANDRO Gray     Time spent during encounter: 15 minutes

## 2023-10-26 RX ORDER — ATENOLOL AND CHLORTHALIDONE TABLET 50; 25 MG/1; MG/1
1 TABLET ORAL DAILY
Qty: 30 TABLET | Refills: 2 | Status: SHIPPED | OUTPATIENT
Start: 2023-10-26

## 2023-10-26 RX ORDER — LISINOPRIL 5 MG/1
5 TABLET ORAL DAILY
Qty: 30 TABLET | Refills: 2 | Status: SHIPPED | OUTPATIENT
Start: 2023-10-26

## 2023-10-26 NOTE — TELEPHONE ENCOUNTER
atenolol-chlorthalidone 50-25 MG Oral Tab, # 30 sent to pharmacy. lisinopril 5 MG Oral Tab, # 30 sent to pharmacy.

## 2023-10-26 NOTE — TELEPHONE ENCOUNTER
From: [facility]. To: Roseanna Gregory  Sent: 10/25/2023 7:54 AM CDT  Subject: Change Script Refills to 30 Days Please    Dr Kemar Bourne, please see note below about denial of medication. Can you please make a change to my prescription for refills to every 30 days? Colten Kiran will only pay for 30 days of meds, hence me asking for refills 30 days later. I was able to get a refill from the pharmacy, but we had to go to the store and talk with them. So, if you could please change my refills to 30 Days, we can avoid this issue going forward. Thank you. SM      Earnest Zaman,      We sent your refills of both medications on 9/6/23 for 90 days. You should have medication until 12/6/23. Thanks,         Shavon VANEGAS   Message from [employer], sent October 23 at 11:23 AM    [employer]   Oct 23, 11:23 AM  The following prescription(s) renewals have been denied:          - atenolol-chlorthalidone 50-25 MG Oral Tab   - lisinopril 5 MG Oral Tab     If we have received multiple requests for this medication renewal (either from you or the pharmacy) you may receive a denial message for any duplicates in addition to an approval message for your medication. We apologize for any confusion.      Please call or send a message to your doctor by clicking the \"Ask a Question\" button at the top of the screen for more information about this denial.

## 2023-12-14 ENCOUNTER — NURSE TRIAGE (OUTPATIENT)
Dept: FAMILY MEDICINE CLINIC | Facility: CLINIC | Age: 58
End: 2023-12-14

## 2023-12-14 NOTE — TELEPHONE ENCOUNTER
RN s/w pt. Pt c/o of wheezing that happens more when he is laying in bed. Pt is using rescue inhaler two times per day without relief of wheezing. Pt says he has had advair in the past, has helped with wheezing. Pt says that wheezing used to be caused by the cat but now that the cat is gone. Pt has been using rescue inhaler several times per week for the last couple of weeks. Pt denies current difficulty breathing. Says he can hear \"little wheezes when he is laying in bed\" Pt thinks symptoms are related to cold weather. Denies fever or COVID exposure at this time. RN offered pt appointment for assessment and evaluation of symptoms. Pt declined scheduling appt at this time- states \"he will pass and just deal with it\"       [facility].          Reason for Disposition   Mild wheezing comes and goes and lasts > 3 days    Protocols used: Asthma Attack-A-[place]

## 2023-12-14 NOTE — TELEPHONE ENCOUNTER
I recommend in-person visit to assess his lungs and vital signs. I may have openings later today if he is interested-awaiting Ghislaine to unblock.

## 2023-12-28 DIAGNOSIS — J45.20 MILD INTERMITTENT ASTHMA WITHOUT COMPLICATION: ICD-10-CM

## 2023-12-28 DIAGNOSIS — I10 ESSENTIAL HYPERTENSION: ICD-10-CM

## 2023-12-28 RX ORDER — ATENOLOL AND CHLORTHALIDONE TABLET 50; 25 MG/1; MG/1
1 TABLET ORAL DAILY
Qty: 30 TABLET | Refills: 2 | Status: SHIPPED | OUTPATIENT
Start: 2023-12-28

## 2023-12-28 RX ORDER — LISINOPRIL 5 MG/1
5 TABLET ORAL DAILY
Qty: 30 TABLET | Refills: 2 | Status: SHIPPED | OUTPATIENT
Start: 2023-12-28

## 2023-12-28 RX ORDER — ALBUTEROL SULFATE 90 UG/1
2 AEROSOL, METERED RESPIRATORY (INHALATION) EVERY 6 HOURS PRN
Qty: 8.5 G | Refills: 0 | Status: SHIPPED | OUTPATIENT
Start: 2023-12-28

## 2024-01-11 ENCOUNTER — OFFICE VISIT (OUTPATIENT)
Dept: FAMILY MEDICINE CLINIC | Facility: CLINIC | Age: 59
End: 2024-01-11
Payer: COMMERCIAL

## 2024-01-11 VITALS
DIASTOLIC BLOOD PRESSURE: 84 MMHG | HEIGHT: 69 IN | WEIGHT: 300.81 LBS | HEART RATE: 53 BPM | OXYGEN SATURATION: 98 % | BODY MASS INDEX: 44.56 KG/M2 | SYSTOLIC BLOOD PRESSURE: 122 MMHG

## 2024-01-11 DIAGNOSIS — I10 ESSENTIAL HYPERTENSION: Primary | ICD-10-CM

## 2024-01-11 DIAGNOSIS — Z23 ENCOUNTER FOR IMMUNIZATION: ICD-10-CM

## 2024-01-11 DIAGNOSIS — R17 ELEVATED BILIRUBIN: ICD-10-CM

## 2024-01-11 DIAGNOSIS — J45.20 MILD INTERMITTENT ASTHMA WITHOUT COMPLICATION: ICD-10-CM

## 2024-01-11 PROCEDURE — 3079F DIAST BP 80-89 MM HG: CPT | Performed by: NURSE PRACTITIONER

## 2024-01-11 PROCEDURE — 3008F BODY MASS INDEX DOCD: CPT | Performed by: NURSE PRACTITIONER

## 2024-01-11 PROCEDURE — 90471 IMMUNIZATION ADMIN: CPT | Performed by: NURSE PRACTITIONER

## 2024-01-11 PROCEDURE — 90677 PCV20 VACCINE IM: CPT | Performed by: NURSE PRACTITIONER

## 2024-01-11 PROCEDURE — 99214 OFFICE O/P EST MOD 30 MIN: CPT | Performed by: NURSE PRACTITIONER

## 2024-01-11 PROCEDURE — 3074F SYST BP LT 130 MM HG: CPT | Performed by: NURSE PRACTITIONER

## 2024-01-11 RX ORDER — FLUTICASONE PROPIONATE AND SALMETEROL 113; 14 UG/1; UG/1
1 POWDER, METERED RESPIRATORY (INHALATION) 2 TIMES DAILY
Qty: 1 EACH | Refills: 2 | Status: SHIPPED | OUTPATIENT
Start: 2024-01-11

## 2024-01-11 NOTE — PROGRESS NOTES
Chief Complaint:   Chief Complaint   Patient presents with    Follow - Up       HPI:   This is a 58 year old male coming in for follow-up. Hx HTN, BP at goal. Reports good compliance with medications. Diet is doing well-he started Weight [employer] recently. Hx asthma, feels like the cold weather triggers his symptoms. Used to take Advair and would like to restart that. Does not feel that his albuterol inhaler fully relieves symptoms when he gets them. Bilirubin elevated in July-[facility] has [facility], did not see psychiatry but stopped on his own.     Results for orders placed or performed in visit on 08/19/23   CBC With Differential With Platelet   Result Value Ref Range    WHITE BLOOD CELL COUNT 8.1 3.8 - 10.8 Thousand/uL    RED BLOOD CELL COUNT 5.06 4.20 - 5.80 Million/uL    HEMOGLOBIN 15.9 13.2 - 17.1 g/dL    HEMATOCRIT 46.3 38.5 - 50.0 %    MCV 91.5 80.0 - 100.0 fL    MCH 31.4 27.0 - 33.0 pg    MCHC 34.3 32.0 - 36.0 g/dL    RDW 12.1 11.0 - 15.0 %    PLATELET COUNT 272 140 - 400 Thousand/uL    MPV 9.8 7.5 - 12.5 fL    ABSOLUTE NEUTROPHILS 5,913 1,500 - 7,800 cells/uL    ABSOLUTE LYMPHOCYTES 1,539 850 - 3,900 cells/uL    ABSOLUTE MONOCYTES 535 200 - 950 cells/uL    ABSOLUTE EOSINOPHILS 73 15 - 500 cells/uL    ABSOLUTE BASOPHILS 41 0 - 200 cells/uL    NEUTROPHILS 73 %    LYMPHOCYTES 19.0 %    MONOCYTES 6.6 %    EOSINOPHILS 0.9 %    BASOPHILS 0.5 %   Comp Metabolic Panel (14)   Result Value Ref Range    GLUCOSE 94 65 - 99 mg/dL    UREA NITROGEN (BUN) 17 7 - 25 mg/dL    CREATININE 0.87 0.70 - 1.30 mg/dL    EGFR 102 > OR = 60 mL/min/1.73m2    BUN/CREATININE RATIO SEE NOTE: 6 - 22 (calc)    SODIUM 142 135 - 146 mmol/L    POTASSIUM 4.0 3.5 - 5.3 mmol/L    CHLORIDE 102 98 - 110 mmol/L    CARBON DIOXIDE 31 20 - 32 mmol/L    CALCIUM 9.7 8.6 - 10.3 mg/dL    PROTEIN, TOTAL 6.8 6.1 - 8.1 g/dL    ALBUMIN 4.4 3.6 - 5.1 g/dL    GLOBULIN 2.4 1.9 - 3.7 g/dL (calc)    ALBUMIN/GLOBULIN RATIO 1.8 1.0 -  2.5 (calc)    BILIRUBIN, TOTAL 2.3 (H) 0.2 - 1.2 mg/dL    ALKALINE PHOSPHATASE 68 35 - 144 U/L    AST 25 10 - 35 U/L    ALT 23 9 - 46 U/L   Hemoglobin A1C   Result Value Ref Range    HEMOGLOBIN A1c 5.0 <5.7 % of total Hgb   Lipid Panel   Result Value Ref Range    CHOLESTEROL, TOTAL 152 <200 mg/dL    HDL CHOLESTEROL 46 > OR = 40 mg/dL    TRIGLYCERIDES 94 <150 mg/dL    LDL-CHOLESTEROL 87 mg/dL (calc)    CHOL/HDLC RATIO 3.3 <5.0 (calc)    NON-HDL CHOLESTEROL 106 <130 mg/dL (calc)   Assay, Thyroid Stim Hormone   Result Value Ref Range    TSH 1.63 0.40 - 4.50 mIU/L   PSA, TOTAL W REFLEX TO PSA, FREE [[id number]][Q]   Result Value Ref Range    TOTAL PSA 0.3 < OR = 4.0 ng/mL             Past Medical History:   Diagnosis Date    Asthma     Back pain 2012    nothing painful, but it is there    Diarrhea, unspecified Various    Have a few times during year    Elevated bilirubin 5/25/2017    Heartburn 2010    3-5 times per year    Jaundice 1968    Had it when I was a child.    Seasonal allergies     Wears glasses 1990    No issue, just need to use glasses     History reviewed. No pertinent surgical history.  Social History:  Social History     Socioeconomic History    Marital status:    Tobacco Use    Smoking status: Never    Smokeless tobacco: Never   Vaping Use    Vaping Use: Never used   Substance and Sexual Activity    Alcohol use: Yes     Alcohol/week: 6.0 - 10.0 standard drinks of alcohol     Types: 6 - 10 Standard drinks or equivalent per week     Comment: Beer    Drug use: No     Family History:  Family History   Problem Relation Age of Onset    Hypertension Father     Colon Cancer Father         Stage 4 at age 80.  Passed away at 81.    Diabetes Mother     Colon Polyps Mother         Had some on her checkup in 2017.    Glaucoma Other         Uncles     Allergies:  No Known Allergies  Current Meds:  Current Outpatient Medications   Medication Sig Dispense Refill    Fluticasone-Salmeterol 113-14 MCG/ACT Inhalation  Aerosol Powder, Breath Activated Inhale 1 puff into the lungs in the morning and 1 puff before bedtime. 1 each 2    albuterol (PROAIR HFA) 108 (90 Base) MCG/ACT Inhalation Aero Soln Inhale 2 puffs into the lungs every 6 (six) hours as needed for Wheezing. 8.5 g 0    atenolol-chlorthalidone 50-25 MG Oral Tab Take 1 tablet by mouth daily. 30 tablet 2    lisinopril 5 MG Oral Tab Take 1 tablet (5 mg total) by mouth daily. 30 tablet 2    PEG 3350-KCl-Na Bicarb-NaCl 420 g Oral Recon Soln Take as directed by physician 4000 mL 0    valACYclovir 1 G Oral Tab Take 1 tablet BID x 1-2 days PRN for cold sore 21 tablet 0      Counseling given: Not Answered       REVIEW OF SYSTEMS:   CONSTITUTIONAL:  Denies unusual weight gain/loss, fever, chills, or fatigue.  INTEGUMENTARY:  Denies rashes, itching, skin lesion.  CARDIOVASCULAR:  Denies chest pain, palpitations, edema, dyspnea on exertion or at rest.  RESPIRATORY:  Denies shortness of breath, wheezing, cough or sputum.  GASTROINTESTINAL:  Denies abdominal pain, nausea, vomiting, constipation, diarrhea, or blood in stool.  NEUROLOGICAL:  Denies headache, seizures, dizziness.    EXAM:   /84 (BP Location: Right arm, Patient Position: Sitting, Cuff Size: large)   Pulse 53   Ht 5' 9\" (1.753 m)   Wt (!) 300 lb 12.8 oz (136.4 kg)   SpO2 98%   BMI 44.42 kg/m²  Estimated body mass index is 44.42 kg/m² as calculated from the following:    Height as of this encounter: 5' 9\" (1.753 m).    Weight as of this encounter: 300 lb 12.8 oz (136.4 kg).   Vital signs reviewed.Appears stated age, well groomed, in no acute distress.  Physical Exam:  GEN:  Patient is alert, awake and oriented, well developed, well nourished.  SKIN: No rashes, no skin lesion, no bruising, good turgor.  HEART:  Regular rate and rhythm, no murmurs, rubs or gallops.  LUNGS: Clear to auscultation bilterally, no rales/rhonchi/wheezing.  NEURO:  No deficit, normal gait, strength and tone, sensory intact.    ASSESSMENT  AND PLAN:   1. Essential hypertension  -Stable, CPM. Follow-up 6 months.    2. Mild intermittent asthma without complication  -Will add Advair BID. May use albuterol PRN. Follow-up 6 months, sooner PRN if symptoms persist.  - Fluticasone-Salmeterol 113-14 MCG/ACT Inhalation Aerosol Powder, Breath Activated; Inhale 1 puff into the lungs in the morning and 1 puff before bedtime.  Dispense: 1 each; Refill: 2    3. Elevated bilirubin  -Will repeat CMP in March.  - Comp Metabolic Panel (14); Future  - Comp Metabolic Panel (14)    4. Encounter for immunization  -Patient believes he had COVID and flu vaccines at [facility] this fall, will send me a message with those dates.  - Prevnar 20 (PCV20) [[id number]]      Meds & Refills for this Visit:  Requested Prescriptions     Signed Prescriptions Disp Refills    Fluticasone-Salmeterol 113-14 MCG/ACT Inhalation Aerosol Powder, Breath Activated 1 each 2     Sig: Inhale 1 puff into the lungs in the morning and 1 puff before bedtime.         Problem List:  Patient Active Problem List   Diagnosis    Essential hypertension    Mild intermittent asthma without complication    Family history of malignant neoplasm of digestive organ    Benign neoplasm of transverse colon    Herpes labialis       Meaghan Bloom, APRN  1/11/2024  4:47 PM

## 2024-02-27 PROBLEM — Z86.0101 HISTORY OF ADENOMATOUS POLYP OF COLON: Status: ACTIVE | Noted: 2024-02-27

## 2024-02-27 PROBLEM — K63.5 POLYP OF COLON: Status: ACTIVE | Noted: 2024-02-27

## 2024-02-27 PROBLEM — Z86.010 HISTORY OF ADENOMATOUS POLYP OF COLON: Status: ACTIVE | Noted: 2024-02-27

## 2024-03-09 LAB
ALBUMIN/GLOBULIN RATIO: 1.7 (CALC) (ref 1–2.5)
ALBUMIN: 4.2 G/DL (ref 3.6–5.1)
ALKALINE PHOSPHATASE: 71 U/L (ref 35–144)
ALT: 21 U/L (ref 9–46)
AST: 21 U/L (ref 10–35)
BILIRUBIN, TOTAL: 2.3 MG/DL (ref 0.2–1.2)
BUN: 9 MG/DL (ref 7–25)
CALCIUM: 9.5 MG/DL (ref 8.6–10.3)
CARBON DIOXIDE: 33 MMOL/L (ref 20–32)
CHLORIDE: 101 MMOL/L (ref 98–110)
CREATININE: 0.76 MG/DL (ref 0.7–1.3)
EGFR: 105 ML/MIN/1.73M2
GLOBULIN: 2.5 G/DL (CALC) (ref 1.9–3.7)
GLUCOSE: 94 MG/DL (ref 65–99)
POTASSIUM: 4 MMOL/L (ref 3.5–5.3)
PROTEIN, TOTAL: 6.7 G/DL (ref 6.1–8.1)
SODIUM: 142 MMOL/L (ref 135–146)

## 2024-04-16 DIAGNOSIS — I10 ESSENTIAL HYPERTENSION: ICD-10-CM

## 2024-04-16 NOTE — TELEPHONE ENCOUNTER
A refill request was received for:  Requested Prescriptions     Pending Prescriptions Disp Refills    LISINOPRIL 5 MG Oral Tab [Pharmacy Med Name: Lisinopril Oral Tablet 5 MG] 30 tablet 0     Sig: TAKE 1 TABLET BY MOUTH EVERY DAY    ATENOLOL-CHLORTHALIDONE 50-25 MG Oral Tab [Pharmacy Med Name: Atenolol-Chlorthalidone Oral Tablet 50-25 MG] 30 tablet 0     Sig: Take 1 tablet by mouth daily.     Lisinopril 5 MG Oral Tab  Last refill date:  12/28/2023  Qty: 30 - 2 Refills  Dx: HTN  Last office visit: 01/11/2024  When is follow up due: 07/11/2024    Atenolol-Chlorthalidone 50-25 MG Oral Tab  Last refill date:  12/28/2023  Qty: 30 - 2 Refills  Dx: HTN  Last office visit: 01/11/2024  When is follow up due: 07/11/2024    No future appointments.

## 2024-04-17 RX ORDER — LISINOPRIL 5 MG/1
5 TABLET ORAL DAILY
Qty: 90 TABLET | Refills: 1 | Status: SHIPPED | OUTPATIENT
Start: 2024-04-17

## 2024-04-17 RX ORDER — LISINOPRIL 5 MG/1
5 TABLET ORAL DAILY
Qty: 30 TABLET | Refills: 0 | Status: SHIPPED | OUTPATIENT
Start: 2024-04-17 | End: 2024-04-17

## 2024-04-17 RX ORDER — ATENOLOL AND CHLORTHALIDONE TABLET 50; 25 MG/1; MG/1
1 TABLET ORAL DAILY
Qty: 90 TABLET | Refills: 1 | Status: SHIPPED | OUTPATIENT
Start: 2024-04-17

## 2024-04-17 RX ORDER — ATENOLOL AND CHLORTHALIDONE TABLET 50; 25 MG/1; MG/1
1 TABLET ORAL DAILY
Qty: 30 TABLET | Refills: 0 | Status: SHIPPED | OUTPATIENT
Start: 2024-04-17 | End: 2024-04-17

## 2024-08-22 ENCOUNTER — OFFICE VISIT (OUTPATIENT)
Dept: FAMILY MEDICINE CLINIC | Facility: CLINIC | Age: 59
End: 2024-08-22
Payer: COMMERCIAL

## 2024-08-22 VITALS
DIASTOLIC BLOOD PRESSURE: 80 MMHG | TEMPERATURE: 98 F | OXYGEN SATURATION: 98 % | HEART RATE: 55 BPM | HEIGHT: 69 IN | SYSTOLIC BLOOD PRESSURE: 138 MMHG | WEIGHT: 263.63 LBS | BODY MASS INDEX: 39.05 KG/M2

## 2024-08-22 DIAGNOSIS — M54.41 CHRONIC BILATERAL LOW BACK PAIN WITH BILATERAL SCIATICA: ICD-10-CM

## 2024-08-22 DIAGNOSIS — M54.42 CHRONIC BILATERAL LOW BACK PAIN WITH BILATERAL SCIATICA: ICD-10-CM

## 2024-08-22 DIAGNOSIS — Z00.00 ADULT GENERAL MEDICAL EXAMINATION: Primary | ICD-10-CM

## 2024-08-22 DIAGNOSIS — Z12.5 SCREENING FOR MALIGNANT NEOPLASM OF PROSTATE: ICD-10-CM

## 2024-08-22 DIAGNOSIS — K76.0 FATTY LIVER: ICD-10-CM

## 2024-08-22 DIAGNOSIS — R17 ELEVATED BILIRUBIN: ICD-10-CM

## 2024-08-22 DIAGNOSIS — R25.2 LEG CRAMPS: ICD-10-CM

## 2024-08-22 DIAGNOSIS — R22.1 MASS OF RIGHT SIDE OF NECK: ICD-10-CM

## 2024-08-22 DIAGNOSIS — I10 ESSENTIAL HYPERTENSION: ICD-10-CM

## 2024-08-22 DIAGNOSIS — G89.29 CHRONIC BILATERAL LOW BACK PAIN WITH BILATERAL SCIATICA: ICD-10-CM

## 2024-08-22 DIAGNOSIS — J45.30 MILD PERSISTENT ASTHMA WITHOUT COMPLICATION (HCC): ICD-10-CM

## 2024-08-22 RX ORDER — ALBUTEROL SULFATE 90 UG/1
2 AEROSOL, METERED RESPIRATORY (INHALATION) EVERY 6 HOURS PRN
Qty: 8.5 G | Refills: 1 | Status: SHIPPED | OUTPATIENT
Start: 2024-08-22

## 2024-08-22 RX ORDER — LISINOPRIL 5 MG/1
5 TABLET ORAL DAILY
Qty: 90 TABLET | Refills: 1 | Status: SHIPPED | OUTPATIENT
Start: 2024-08-22

## 2024-08-22 RX ORDER — ATENOLOL AND CHLORTHALIDONE TABLET 50; 25 MG/1; MG/1
1 TABLET ORAL DAILY
Qty: 90 TABLET | Refills: 1 | Status: SHIPPED | OUTPATIENT
Start: 2024-08-22

## 2024-08-22 RX ORDER — FLUTICASONE PROPIONATE AND SALMETEROL 113; 14 UG/1; UG/1
1 POWDER, METERED RESPIRATORY (INHALATION) 2 TIMES DAILY
Qty: 1 EACH | Refills: 2 | Status: SHIPPED | OUTPATIENT
Start: 2024-08-22

## 2024-08-22 NOTE — PROGRESS NOTES
Chief Complaint:   Chief Complaint   Patient presents with    Physical       HPI:   This is a 58 year old male coming in for physical. Feels generally well. Has been trying to eat a more healthy diet, not really exercising. Drinking alcohol in moderation. Continues to have back pain, which is improving with weight loss. Would like to do PT again. Pain is daily, intermittent.     Hx asthma, symptoms worse during cold weather. Has been well-controlled lately. Reports good compliance with medications. Hx elevated bilirubin with fatty liver on ultrasound, due for repeat labs. Asymptomatic.     Has had a lump on right side of neck x years. He thinks it is more prominent now since he has lost some weight. Reports he had a biopsy of it >15 years ago, report not available, he does not remember results. Has some cramps in both legs while sleeping, usually in calves and ankles, that sometimes wake him from sleep. Drinks water/Gatorade throughout the day.     Results for orders placed or performed in visit on 01/11/24   Comp Metabolic Panel (14)   Result Value Ref Range    GLUCOSE 94 65 - 99 mg/dL    UREA NITROGEN (BUN) 9 7 - 25 mg/dL    CREATININE 0.76 0.70 - 1.30 mg/dL    EGFR 105 > OR = 60 mL/min/1.73m2    BUN/CREATININE RATIO SEE NOTE: 6 - 22 (calc)    SODIUM 142 135 - 146 mmol/L    POTASSIUM 4.0 3.5 - 5.3 mmol/L    CHLORIDE 101 98 - 110 mmol/L    CARBON DIOXIDE 33 (H) 20 - 32 mmol/L    CALCIUM 9.5 8.6 - 10.3 mg/dL    PROTEIN, TOTAL 6.7 6.1 - 8.1 g/dL    ALBUMIN 4.2 3.6 - 5.1 g/dL    GLOBULIN 2.5 1.9 - 3.7 g/dL (calc)    ALBUMIN/GLOBULIN RATIO 1.7 1.0 - 2.5 (calc)    BILIRUBIN, TOTAL 2.3 (H) 0.2 - 1.2 mg/dL    ALKALINE PHOSPHATASE 71 35 - 144 U/L    AST 21 10 - 35 U/L    ALT 21 9 - 46 U/L             Past Medical History:    Asthma (HCC)    Back pain    nothing painful, but it is there    Diarrhea, unspecified    Have a few times during year    Elevated bilirubin    Heartburn    3-5 times per year    Jaundice    Had it  when I was a child.    Seasonal allergies    Wears glasses    No issue, just need to use glasses     Past Surgical History:   Procedure Laterality Date    Colonoscopy  01/2019     Social History:  Social History     Socioeconomic History    Marital status:    Tobacco Use    Smoking status: Never    Smokeless tobacco: Never   Vaping Use    Vaping status: Never Used   Substance and Sexual Activity    Alcohol use: Yes     Alcohol/week: 6.0 standard drinks of alcohol     Types: 6 Cans of beer per week     Comment: Beer    Drug use: No     Family History:  Family History   Problem Relation Age of Onset    Hypertension Father     Colon Cancer Father         Stage 4 at age 80.  Passed away at 81.    Diabetes Mother     Colon Polyps Mother         Had some on her checkup in 2017.    Glaucoma Other         Uncles     Allergies:  No Known Allergies  Current Meds:  Current Outpatient Medications   Medication Sig Dispense Refill    albuterol (PROAIR HFA) 108 (90 Base) MCG/ACT Inhalation Aero Soln Inhale 2 puffs into the lungs every 6 (six) hours as needed for Wheezing. 8.5 g 1    atenolol-chlorthalidone 50-25 MG Oral Tab Take 1 tablet by mouth daily. 90 tablet 1    Fluticasone-Salmeterol 113-14 MCG/ACT Inhalation Aerosol Powder, Breath Activated Inhale 1 puff into the lungs in the morning and 1 puff before bedtime. 1 each 2    lisinopril 5 MG Oral Tab Take 1 tablet (5 mg total) by mouth daily. 90 tablet 1    valACYclovir 1 G Oral Tab Take 1 tablet BID x 1-2 days PRN for cold sore 21 tablet 0      Counseling given: Not Answered       REVIEW OF SYSTEMS:   CONSTITUTIONAL:  Denies unusual weight gain/loss, fever, chills, or fatigue.  HEENT:  Eyes:  Denies eye pain, visual loss, blurred vision. Denies hearing loss, sneezing, congestion, runny nose or sore throat.  INTEGUMENTARY:  Denies rashes, itching, skin lesion.  CARDIOVASCULAR:  Denies chest pain, palpitations, edema, dyspnea on exertion or at rest.  RESPIRATORY:  Denies  shortness of breath, wheezing, cough or sputum.  GASTROINTESTINAL:  Denies abdominal pain, nausea, vomiting, constipation, diarrhea, or blood in stool.  GENITOURINARY: Denies dysuria, hematuria, frequency.  MUSCULOSKELETAL:  Leg cramps as per HPI.  NEUROLOGICAL:  Denies headache, seizures, dizziness.  PSYCHIATRIC:  Denies depression or anxiety. Denies suicidal thoughts.    EXAM:   /80   Pulse 55   Temp 98.1 °F (36.7 °C)   Ht 5' 9\" (1.753 m)   Wt 263 lb 9.6 oz (119.6 kg)   SpO2 98%   BMI 38.93 kg/m²  Estimated body mass index is 38.93 kg/m² as calculated from the following:    Height as of this encounter: 5' 9\" (1.753 m).    Weight as of this encounter: 263 lb 9.6 oz (119.6 kg).   Vital signs reviewed.Appears stated age, well groomed, in no acute distress.  Physical Exam:  GEN:  Patient is alert, awake and oriented, well developed, well nourished.  HEENT:  Head:  Normocephalic, atraumatic Eyes: EOMI, PERRLA, conjunctivae clear bilaterally, no eye discharge Ears: External normal, TM clear bilaterally. Nose: patent, no nasal discharge. Throat:  No tonsillar erythema or exudate.  Mouth:  No oral lesions, good dentition.  NECK: Supple, no CLAD, no thyromegaly. Superficial, soft, non-moveable, ovoid lump present in right side of neck. Non-tender to palpation.   SKIN: No rashes, no skin lesion, no bruising, good turgor.  HEART:  Regular rate and rhythm, no murmurs, rubs or gallops.  LUNGS: Clear to auscultation bilterally, no rales/rhonchi/wheezing.  ABDOMEN:  Soft, nondistended, nontender, bowel sounds normal in all 4 quadrants, no masses, no hepatosplenomegaly.  BACK: No tenderness to palpation, FROM.  EXTREMITIES:  No edema, no cyanosis, no clubbing, FROM, 2+ dorsalis pedis pulses bilaterally.  NEURO:  No deficit, normal gait, strength and tone, sensory intact, normal reflexes.    ASSESSMENT AND PLAN:   1. Adult general medical examination  -Healthy diet and regular exercise.  -Annual eye exam and biannual  dental visits.  -Labs as below.   -Flu vaccine in fall.  - CBC With Differential With Platelet  - Comp Metabolic Panel (14)  - Hemoglobin A1C  - Lipid Panel  - Assay, Thyroid Stim Hormone    2. Essential hypertension  -Stable, CPM. Follow-up 6 months. Low-salt diet, regular exercise.  - atenolol-chlorthalidone 50-25 MG Oral Tab; Take 1 tablet by mouth daily.  Dispense: 90 tablet; Refill: 1  - lisinopril 5 MG Oral Tab; Take 1 tablet (5 mg total) by mouth daily.  Dispense: 90 tablet; Refill: 1  - CBC With Differential With Platelet  - Comp Metabolic Panel (14)    3. Mild persistent asthma without complication (HCC)  -Stable, CPM. AAP sent to patient. Follow-up 6 months, sooner PRN.  - albuterol (PROAIR HFA) 108 (90 Base) MCG/ACT Inhalation Aero Soln; Inhale 2 puffs into the lungs every 6 (six) hours as needed for Wheezing.  Dispense: 8.5 g; Refill: 1  - Fluticasone-Salmeterol 113-14 MCG/ACT Inhalation Aerosol Powder, Breath Activated; Inhale 1 puff into the lungs in the morning and 1 puff before bedtime.  Dispense: 1 each; Refill: 2    4. Elevated bilirubin  -Will repeat labs now.     5. Fatty liver  -Low-fat diet, regular exercise, avoiding alcohol recommended.     6. Mass of right side of neck  -Will check US to characterize mass.   - z [facility] US HEAD/NECK (CPT=76536); Future  - z [facility] US HEAD/NECK (CPT=76536)    7. Leg cramps  -Will check above labs and iron, magnesium. Recommended to hydrate well, stretch before bed.   - Ferritin  - Iron And Tibc  - Magnesium    8. Screening for malignant neoplasm of prostate  - PSA, TOTAL W REFLEX TO PSA, FREE [[id number]][Q]    9. Chronic bilateral low back pain with bilateral sciatica  -Referred to PT per request.  - PHYSICAL THERAPY - INTERNAL      Meds & Refills for this Visit:  Requested Prescriptions     Signed Prescriptions Disp Refills    albuterol (PROAIR HFA) 108 (90 Base) MCG/ACT Inhalation Aero Soln 8.5 g 1     Sig: Inhale 2 puffs into the lungs every 6 (six) hours as  needed for Wheezing.    atenolol-chlorthalidone 50-25 MG Oral Tab 90 tablet 1     Sig: Take 1 tablet by mouth daily.    Fluticasone-Salmeterol 113-14 MCG/ACT Inhalation Aerosol Powder, Breath Activated 1 each 2     Sig: Inhale 1 puff into the lungs in the morning and 1 puff before bedtime.    lisinopril 5 MG Oral Tab 90 tablet 1     Sig: Take 1 tablet (5 mg total) by mouth daily.         Problem List:  Patient Active Problem List   Diagnosis    Essential hypertension    Mild intermittent asthma without complication (HCC)    Family history of malignant neoplasm of digestive organ    Benign neoplasm of transverse colon    Herpes labialis    History of adenomatous polyp of colon    Polyp of colon       Meaghan Bloom, APRN  8/22/2024  4:45 PM

## 2025-04-21 DIAGNOSIS — I10 ESSENTIAL HYPERTENSION: ICD-10-CM

## 2025-04-21 RX ORDER — LISINOPRIL 5 MG/1
5 TABLET ORAL DAILY
Qty: 90 TABLET | Refills: 0 | Status: SHIPPED | OUTPATIENT
Start: 2025-04-21

## 2025-04-21 RX ORDER — ATENOLOL AND CHLORTHALIDONE TABLET 50; 25 MG/1; MG/1
1 TABLET ORAL DAILY
Qty: 90 TABLET | Refills: 0 | Status: SHIPPED | OUTPATIENT
Start: 2025-04-21

## 2025-04-21 NOTE — TELEPHONE ENCOUNTER
A refill request was received for:  Requested Prescriptions     Pending Prescriptions Disp Refills    ATENOLOL-CHLORTHALIDONE 50-25 MG Oral Tab [Pharmacy Med Name: Atenolol-Chlorthalidone Oral Tablet 50-25 MG] 90 tablet 0     Sig: TAKE 1 TABLET BY MOUTH EVERY DAY    LISINOPRIL 5 MG Oral Tab [Pharmacy Med Name: Lisinopril Oral Tablet 5 MG] 90 tablet 0     Sig: TAKE 1 TABLET BY MOUTH EVERY DAY     Last refill date:  Both - 8/22/2024  Qty: Both - 90 tablets and 1  Dx: hypertension  Last office visit: 8/22/2024  When is follow up due: 2/22/2025    No future appointments.

## 2025-04-25 LAB
% SATURATION: 57 % (CALC) (ref 20–48)
ABSOLUTE BASOPHILS: 68 CELLS/UL (ref 0–200)
ABSOLUTE EOSINOPHILS: 106 CELLS/UL (ref 15–500)
ABSOLUTE LYMPHOCYTES: 1520 CELLS/UL (ref 850–3900)
ABSOLUTE MONOCYTES: 494 CELLS/UL (ref 200–950)
ABSOLUTE NEUTROPHILS: 5411 CELLS/UL (ref 1500–7800)
ALBUMIN/GLOBULIN RATIO: 2 (CALC) (ref 1–2.5)
ALBUMIN: 4.3 G/DL (ref 3.6–5.1)
ALKALINE PHOSPHATASE: 55 U/L (ref 35–144)
ALT: 21 U/L (ref 9–46)
AST: 22 U/L (ref 10–35)
BASOPHILS: 0.9 %
BILIRUBIN, TOTAL: 2.7 MG/DL (ref 0.2–1.2)
BUN: 21 MG/DL (ref 7–25)
CALCIUM: 9.2 MG/DL (ref 8.6–10.3)
CARBON DIOXIDE: 28 MMOL/L (ref 20–32)
CHLORIDE: 103 MMOL/L (ref 98–110)
CHOL/HDLC RATIO: 3.2 (CALC)
CHOLESTEROL, TOTAL: 155 MG/DL
CREATININE: 0.72 MG/DL (ref 0.7–1.3)
EGFR: 107 ML/MIN/1.73M2
EOSINOPHILS: 1.4 %
FERRITIN: 427 NG/ML (ref 38–380)
GLOBULIN: 2.1 G/DL (CALC) (ref 1.9–3.7)
GLUCOSE: 98 MG/DL (ref 65–99)
HDL CHOLESTEROL: 48 MG/DL
HEMATOCRIT: 46.2 % (ref 38.5–50)
HEMOGLOBIN A1C: 5 %
HEMOGLOBIN: 15.4 G/DL (ref 13.2–17.1)
IRON BINDING CAPACITY: 235 MCG/DL (CALC) (ref 250–425)
IRON, TOTAL: 133 MCG/DL (ref 50–180)
LDL-CHOLESTEROL: 89 MG/DL (CALC)
LYMPHOCYTES: 20 %
MAGNESIUM: 2.2 MG/DL (ref 1.5–2.5)
MCH: 31.5 PG (ref 27–33)
MCHC: 33.3 G/DL (ref 32–36)
MCV: 94.5 FL (ref 80–100)
MONOCYTES: 6.5 %
MPV: 9.6 FL (ref 7.5–12.5)
NEUTROPHILS: 71.2 %
NON-HDL CHOLESTEROL: 107 MG/DL (CALC)
PLATELET COUNT: 257 THOUSAND/UL (ref 140–400)
POTASSIUM: 3.5 MMOL/L (ref 3.5–5.3)
PROTEIN, TOTAL: 6.4 G/DL (ref 6.1–8.1)
RDW: 12.2 % (ref 11–15)
RED BLOOD CELL COUNT: 4.89 MILLION/UL (ref 4.2–5.8)
SODIUM: 142 MMOL/L (ref 135–146)
TOTAL PSA: 0.4 NG/ML
TRIGLYCERIDES: 87 MG/DL
TSH: 1.81 MIU/L (ref 0.4–4.5)
WHITE BLOOD CELL COUNT: 7.6 THOUSAND/UL (ref 3.8–10.8)

## 2025-05-01 ENCOUNTER — OFFICE VISIT (OUTPATIENT)
Dept: FAMILY MEDICINE CLINIC | Facility: CLINIC | Age: 60
End: 2025-05-01
Payer: COMMERCIAL

## 2025-05-01 VITALS
HEART RATE: 55 BPM | HEIGHT: 69 IN | WEIGHT: 257 LBS | BODY MASS INDEX: 38.06 KG/M2 | OXYGEN SATURATION: 99 % | SYSTOLIC BLOOD PRESSURE: 134 MMHG | DIASTOLIC BLOOD PRESSURE: 70 MMHG | TEMPERATURE: 98 F

## 2025-05-01 DIAGNOSIS — R17 ELEVATED BILIRUBIN: ICD-10-CM

## 2025-05-01 DIAGNOSIS — J45.30 MILD PERSISTENT ASTHMA WITHOUT COMPLICATION (HCC): Primary | ICD-10-CM

## 2025-05-01 DIAGNOSIS — R79.89 ELEVATED FERRITIN: ICD-10-CM

## 2025-05-01 DIAGNOSIS — I10 ESSENTIAL HYPERTENSION: ICD-10-CM

## 2025-05-01 PROCEDURE — 99214 OFFICE O/P EST MOD 30 MIN: CPT | Performed by: NURSE PRACTITIONER

## 2025-05-01 RX ORDER — FLUTICASONE PROPIONATE AND SALMETEROL 113; 14 UG/1; UG/1
1 POWDER, METERED RESPIRATORY (INHALATION) 2 TIMES DAILY
Qty: 1 EACH | Refills: 2 | Status: SHIPPED | OUTPATIENT
Start: 2025-05-01

## 2025-05-01 RX ORDER — ALBUTEROL SULFATE 90 UG/1
2 INHALANT RESPIRATORY (INHALATION) EVERY 6 HOURS PRN
Qty: 8.5 G | Refills: 1 | Status: SHIPPED | OUTPATIENT
Start: 2025-05-01

## 2025-05-01 NOTE — PROGRESS NOTES
Chief Complaint:   Chief Complaint   Patient presents with    Follow - Up       HPI:   This is a 59 year old male coming in for follow-up. Feels well overall. Has been trying to lose weight with [employer]. Was exercising more about a month ago but has not gone as much lately. Back pain is better overall. Reports good compliance with BP medications. Asthma has been well-controlled.    Recent labs show increased iron levels along with increased bilirubin 2.7 up from 2.3. Previous liver US showed fatty liver. No known hx of iron overload.    Results for orders placed or performed in visit on 08/22/24   CBC With Differential With Platelet    Collection Time: 04/24/25  6:40 AM   Result Value Ref Range    WHITE BLOOD CELL COUNT 7.6 3.8 - 10.8 Thousand/uL    RED BLOOD CELL COUNT 4.89 4.20 - 5.80 Million/uL    HEMOGLOBIN 15.4 13.2 - 17.1 g/dL    HEMATOCRIT 46.2 38.5 - 50.0 %    MCV 94.5 80.0 - 100.0 fL    MCH 31.5 27.0 - 33.0 pg    MCHC 33.3 32.0 - 36.0 g/dL    RDW 12.2 11.0 - 15.0 %    PLATELET COUNT 257 140 - 400 Thousand/uL    MPV 9.6 7.5 - 12.5 fL    ABSOLUTE NEUTROPHILS 5,411 1,500 - 7,800 cells/uL    ABSOLUTE LYMPHOCYTES 1,520 850 - 3,900 cells/uL    ABSOLUTE MONOCYTES 494 200 - 950 cells/uL    ABSOLUTE EOSINOPHILS 106 15 - 500 cells/uL    ABSOLUTE BASOPHILS 68 0 - 200 cells/uL    NEUTROPHILS 71.2 %    LYMPHOCYTES 20.0 %    MONOCYTES 6.5 %    EOSINOPHILS 1.4 %    BASOPHILS 0.9 %   Comp Metabolic Panel (14)    Collection Time: 04/24/25  6:40 AM   Result Value Ref Range    GLUCOSE 98 65 - 99 mg/dL    UREA NITROGEN (BUN) 21 7 - 25 mg/dL    CREATININE 0.72 0.70 - 1.30 mg/dL    EGFR 107 > OR = 60 mL/min/1.73m2    BUN/CREATININE RATIO SEE NOTE: 6 - 22 (calc)    SODIUM 142 135 - 146 mmol/L    POTASSIUM 3.5 3.5 - 5.3 mmol/L    CHLORIDE 103 98 - 110 mmol/L    CARBON DIOXIDE 28 20 - 32 mmol/L    CALCIUM 9.2 8.6 - 10.3 mg/dL    PROTEIN, TOTAL 6.4 6.1 - 8.1 g/dL    ALBUMIN 4.3 3.6 - 5.1 g/dL    GLOBULIN 2.1 1.9 - 3.7 g/dL  (calc)    ALBUMIN/GLOBULIN RATIO 2.0 1.0 - 2.5 (calc)    BILIRUBIN, TOTAL 2.7 (H) 0.2 - 1.2 mg/dL    ALKALINE PHOSPHATASE 55 35 - 144 U/L    AST 22 10 - 35 U/L    ALT 21 9 - 46 U/L   Hemoglobin A1C    Collection Time: 04/24/25  6:40 AM   Result Value Ref Range    HEMOGLOBIN A1c 5.0 <5.7 %   Lipid Panel    Collection Time: 04/24/25  6:40 AM   Result Value Ref Range    CHOLESTEROL, TOTAL 155 <200 mg/dL    HDL CHOLESTEROL 48 > OR = 40 mg/dL    TRIGLYCERIDES 87 <150 mg/dL    LDL-CHOLESTEROL 89 mg/dL (calc)    CHOL/HDLC RATIO 3.2 <5.0 (calc)    NON-HDL CHOLESTEROL 107 <130 mg/dL (calc)   Assay, Thyroid Stim Hormone    Collection Time: 04/24/25  6:40 AM   Result Value Ref Range    TSH 1.81 0.40 - 4.50 mIU/L   Ferritin    Collection Time: 04/24/25  6:40 AM   Result Value Ref Range    FERRITIN 427 (H) 38 - 380 ng/mL   Iron And Tibc    Collection Time: 04/24/25  6:40 AM   Result Value Ref Range    IRON, TOTAL 133 50 - 180 mcg/dL    IRON BINDING CAPACITY 235 (L) 250 - 425 mcg/dL (calc)    % SATURATION 57 (H) 20 - 48 % (calc)   Magnesium    Collection Time: 04/24/25  6:40 AM   Result Value Ref Range    MAGNESIUM 2.2 1.5 - 2.5 mg/dL   PSA, TOTAL WITH REFLEX TO PSA, FREE    Collection Time: 04/24/25  6:40 AM   Result Value Ref Range    TOTAL PSA 0.4 < OR = 4.0 ng/mL             Past Medical History[1]  Past Surgical History[2]  Social History:  Short Social Hx on File[3]  Family History:  Family History[4]  Allergies:  Allergies[5]  Current Meds:  Current Medications[6]   Counseling given: Not Answered       REVIEW OF SYSTEMS:   CONSTITUTIONAL:  Denies unusual weight gain/loss, fever, chills, or fatigue.  CARDIOVASCULAR:  Denies chest pain, palpitations, edema, dyspnea on exertion or at rest.  RESPIRATORY:  Denies shortness of breath, wheezing, cough or sputum.  GASTROINTESTINAL:  Denies abdominal pain, nausea, vomiting, constipation, diarrhea, or blood in stool.  GENITOURINARY: Denies dysuria, hematuria,  frequency.  NEUROLOGICAL:  Denies headache, seizures, dizziness.    EXAM:   /70   Pulse 55   Temp 98.1 °F (36.7 °C)   Ht 5' 9\" (1.753 m)   Wt 257 lb (116.6 kg)   SpO2 99%   BMI 37.95 kg/m²  Estimated body mass index is 37.95 kg/m² as calculated from the following:    Height as of this encounter: 5' 9\" (1.753 m).    Weight as of this encounter: 257 lb (116.6 kg).   Vital signs reviewed.Appears stated age, well groomed, in no acute distress.  Physical Exam:  GEN:  Patient is alert, awake and oriented, well developed, well nourished.  SKIN: No rashes, no skin lesion, no bruising, good turgor.  HEART:  Regular rate and rhythm, no murmurs, rubs or gallops.  LUNGS: Clear to auscultation bilterally, no rales/rhonchi/wheezing.  EXTREMITIES:  No edema.  NEURO:  No deficit, normal gait, strength and tone, sensory intact.    ASSESSMENT AND PLAN:   1. Mild intermittent asthma without complication (HCC)  -Stable, CPM. Follow-up 6 months, sooner PRN. Avoid triggers.    2. Essential hypertension  -Stable, CPM. Follow-up 6 months, sooner PRN.    3. Elevated bilirubin  -Consider hereditary hemachromatosis or other cause. Will refer to hematology for follow-up. Recheck liver panel 6 months. Low-fat diet, avoid ETOH and hepatotoxic medications. Asymptomatic.   - Oncology/Hematology Referral - In Network    5. Elevated ferritin  -See above. Follow-up with hematology.   - Oncology/Hematology Referral - In Network      Meds & Refills for this Visit:  Requested Prescriptions     Signed Prescriptions Disp Refills    Fluticasone-Salmeterol 113-14 MCG/ACT Inhalation Aerosol Powder, Breath Activated 1 each 2     Sig: Inhale 1 puff into the lungs in the morning and 1 puff before bedtime.    albuterol (PROAIR HFA) 108 (90 Base) MCG/ACT Inhalation Aero Soln 8.5 g 1     Sig: Inhale 2 puffs into the lungs every 6 (six) hours as needed for Wheezing.         Problem List:  Problem List[7]    Meaghan Bloom, APRN  5/1/2025  5:15 PM          [1]   Past Medical History:   Asthma (HCC)    Back pain    nothing painful, but it is there    Diarrhea, unspecified    Have a few times during year    Elevated bilirubin    Heartburn    3-5 times per year    Jaundice    Had it when I was a child.    Seasonal allergies    Wears glasses    No issue, just need to use glasses   [2]   Past Surgical History:  Procedure Laterality Date    Colonoscopy  01/2019   [3]   Social History  Socioeconomic History    Marital status:    Tobacco Use    Smoking status: Never    Smokeless tobacco: Never   Vaping Use    Vaping status: Never Used   Substance and Sexual Activity    Alcohol use: Yes     Alcohol/week: 6.0 standard drinks of alcohol     Types: 6 Cans of beer per week     Comment: Beer    Drug use: No   [4]   Family History  Problem Relation Age of Onset    Hypertension Father     Colon Cancer Father         Stage 4 at age 80.  Passed away at 81.    Diabetes Mother     Colon Polyps Mother         Had some on her checkup in 2017.    Glaucoma Other         Uncles   [5] No Known Allergies  [6]   Current Outpatient Medications   Medication Sig Dispense Refill    Fluticasone-Salmeterol 113-14 MCG/ACT Inhalation Aerosol Powder, Breath Activated Inhale 1 puff into the lungs in the morning and 1 puff before bedtime. 1 each 2    albuterol (PROAIR HFA) 108 (90 Base) MCG/ACT Inhalation Aero Soln Inhale 2 puffs into the lungs every 6 (six) hours as needed for Wheezing. 8.5 g 1    ATENOLOL-CHLORTHALIDONE 50-25 MG Oral Tab TAKE 1 TABLET BY MOUTH EVERY DAY 90 tablet 0    LISINOPRIL 5 MG Oral Tab TAKE 1 TABLET BY MOUTH EVERY DAY 90 tablet 0    valACYclovir 1 G Oral Tab Take 1 tablet BID x 1-2 days PRN for cold sore 21 tablet 0   [7]   Patient Active Problem List  Diagnosis    Essential hypertension    Mild intermittent asthma without complication (HCC)    Family history of malignant neoplasm of digestive organ    Benign neoplasm of transverse colon    Herpes labialis    History of  adenomatous polyp of colon    Polyp of colon

## 2025-07-28 DIAGNOSIS — I10 ESSENTIAL HYPERTENSION: ICD-10-CM

## 2025-07-30 RX ORDER — LISINOPRIL 5 MG/1
5 TABLET ORAL DAILY
Qty: 90 TABLET | Refills: 3 | Status: SHIPPED | OUTPATIENT
Start: 2025-07-30

## 2025-07-30 RX ORDER — ATENOLOL AND CHLORTHALIDONE TABLET 50; 25 MG/1; MG/1
1 TABLET ORAL DAILY
Qty: 90 TABLET | Refills: 3 | Status: SHIPPED | OUTPATIENT
Start: 2025-07-30

## 2025-08-19 DIAGNOSIS — J45.30 MILD PERSISTENT ASTHMA WITHOUT COMPLICATION (HCC): ICD-10-CM

## 2025-08-22 DIAGNOSIS — I10 ESSENTIAL HYPERTENSION: ICD-10-CM

## 2025-08-25 RX ORDER — ALBUTEROL SULFATE 90 UG/1
2 INHALANT RESPIRATORY (INHALATION) EVERY 6 HOURS PRN
Qty: 3 EACH | Refills: 1 | Status: SHIPPED | OUTPATIENT
Start: 2025-08-25

## 2025-08-25 RX ORDER — FLUTICASONE PROPIONATE AND SALMETEROL 113; 14 UG/1; UG/1
1 POWDER, METERED RESPIRATORY (INHALATION) 2 TIMES DAILY
Qty: 3 EACH | Refills: 3 | Status: SHIPPED | OUTPATIENT
Start: 2025-08-25

## 2025-08-26 ENCOUNTER — HOSPITAL ENCOUNTER (OUTPATIENT)
Age: 60
Discharge: HOME | End: 2025-08-26

## 2025-08-26 VITALS
BODY MASS INDEX: 38 KG/M2 | TEMPERATURE: 99 F | DIASTOLIC BLOOD PRESSURE: 83 MMHG | WEIGHT: 258 LBS | OXYGEN SATURATION: 99 % | RESPIRATION RATE: 16 BRPM | SYSTOLIC BLOOD PRESSURE: 153 MMHG | HEART RATE: 57 BPM

## 2025-08-26 DIAGNOSIS — M43.6 TORTICOLLIS: Primary | ICD-10-CM

## 2025-08-26 PROCEDURE — 99204 OFFICE O/P NEW MOD 45 MIN: CPT

## 2025-08-26 PROCEDURE — 99213 OFFICE O/P EST LOW 20 MIN: CPT

## 2025-08-26 RX ORDER — METHYLPREDNISOLONE 4 MG/1
TABLET ORAL
Qty: 1 EACH | Refills: 0 | Status: SHIPPED | OUTPATIENT
Start: 2025-08-26

## 2025-08-26 RX ORDER — CYCLOBENZAPRINE HCL 10 MG
10 TABLET ORAL 3 TIMES DAILY PRN
Qty: 20 TABLET | Refills: 0 | Status: SHIPPED | OUTPATIENT
Start: 2025-08-26 | End: 2025-09-02

## 2025-08-27 RX ORDER — ATENOLOL AND CHLORTHALIDONE TABLET 50; 25 MG/1; MG/1
1 TABLET ORAL DAILY
Qty: 90 TABLET | Refills: 3 | OUTPATIENT
Start: 2025-08-27

## 2025-08-27 RX ORDER — LISINOPRIL 5 MG/1
5 TABLET ORAL DAILY
Qty: 90 TABLET | Refills: 3 | OUTPATIENT
Start: 2025-08-27

## (undated) DIAGNOSIS — I10 ESSENTIAL HYPERTENSION: ICD-10-CM

## (undated) DIAGNOSIS — J45.20 MILD INTERMITTENT ASTHMA WITHOUT COMPLICATION: ICD-10-CM

## (undated) NOTE — MR AVS SNAPSHOT
[address] at [address], [address]  [address] [place] [address]  [phone]               Thank you for choosing us for your health care visit with Jori Perdomo MD.  We are glad to serve you and happy to [facility] ([address])  [address] [facility] [address], [address] [facility]  [address], [address] (MRI Only)  [address]    It is the patient's responsibility to - fluticasone-salmeterol 250-50 MCG/DOSE Aepb            MyChart     Visit MyChart  You can access your MyChart to more actively manage your health care and view more details from this visit by going to [email or web address].   If you've recently had Don’t forget strength training with weights and resistance Set goals and track your progress   You don’t need to join a gym. Home exercises work great.  Put more priority on exercise in your life                    Visit [facility] online at

## (undated) NOTE — LETTER
ASTHMA ACTION PLAN for [facility]. : 10/29/1965     Date: 2023  Provider:  ALEJANDRO Montejo  [facility]  [address] [facility] [address]  [address]  [phone]    ACT Score: 23      You can use the colors of a traffic light to help learn about your asthma medicines. 1. Green - Go! % of Personal Best Peak Flow Use controller medicine. Breathing is good  No cough or wheeze  Can work and play Medicine How much to take When to take it    Advair                      1 puff                                Twice daily      2. Yellow - Caution. 50-79% Personal Best Peak  Flow. Use reliever medicine to keep an asthma attack from getting bad. Cough  Wheezing  Tight Chest  Wake up at night Medicine How much to take When to take it    Advair as above  Albuterol                     2 puffs                                  Every 4-6 hours as needed       Additional instructions Call office if no improvement        3. Red - Stop! Danger!  <50% Personal Best Peak  Flow. Take these medications until  Get help from a doctor   Medicine not helping  Breathing is hard and fast  Nose opens wide  Can't walk  Ribs show  Can't talk well Medicine How much to take When to take it    Medications as above and to ER     Additional Instructions If your symptoms do not improve and you cannot contact your doctor, [facility] or call 911 immediately! [x] Asthma Action Plan reviewed with patient (and caregiver if necessary) and a copy of the plan was given to the patient/caregiver. [] Asthma Action Plan reviewed with patient (and caregiver if necessary) on the phone and mailed copy to patient or submitted via [address]      Signatures:  Provider  ALEJANDRO Montejo   Patient Caretaker

## (undated) NOTE — LETTER
ASTHMA ACTION PLAN for [facility].      : 10/29/1965     Date: 18  Doctor:  Joey Leslie MD  Phone for doctor or clinic: [address], [address] [facility], [address] [facility] Medicine not helping  Breathing is hard and fast  Nose opens wide  Can't walk  Ribs show  Can't talk well Medicine How much to take When to take it    If your symptoms do not improve in ONE hour -  go to the emergency room or call 911 immediately!  If sympt

## (undated) NOTE — LETTER
ASTHMA ACTION PLAN for [facility]     : 10/29/1965     Date: 2024  Provider:  ALEJANDRO Paul  Phone for doctor or clinic: [facility], [place], [place]  [address] [place] [facility] [address]  [place] [address] [phone]           You can use the colors of a traffic light to help learn about your asthma medicines.      1. Green - Go! % of Personal Best Peak Flow Use controller medicine.   Breathing is good  No cough or wheeze  Can work and play Medicine How much to take When to take it    Fluticasone-salmeterol      1 puff                        Twice daily      2. Yellow - Caution. 50-79% Personal Best Peak  Flow.  Use reliever medicine to keep an asthma attack from getting bad.   Cough  Wheezing  Tight Chest  Wake up at night Medicine How much to take When to take it    Medicine as above.  Albuterol                      2 puffs                                 Every 6 hours as needed       Additional instructions Call office if no improvement        3. Red - Stop! Danger!  <50% Personal Best Peak  Flow. Take these medications until  Get help from a doctor   Medicine not helping  Breathing is hard and fast  Nose opens wide  Can't walk  Ribs show  Can't talk well Medicine How much to take When to take it    Medications as above and go to ER.     Additional Instructions If your symptoms do not improve and you cannot contact your doctor, [facility] or call 911 immediately!     [x] Asthma Action Plan reviewed with patient (and caregiver if necessary) and a copy of the plan was given to the patient/caregiver.   [] Asthma Action Plan reviewed with patient (and caregiver if necessary) on the phone and mailed copy to patient or submitted via [employer].     Signatures:  Provider  ALEJANDRO Paul   Patient Caretaker

## (undated) NOTE — LETTER
ASTHMA ACTION PLAN for [facility].      : 10/29/1965     Date: 20    Doctor:  Olamide Acosta MD  Phone for doctor or clinic: [address] , [address] If your symptoms do not improve in ONE hour -  go to the emergency room or call 911 immediately! If symptoms improve, call office for appointment immediately.     Albuterol inhaler 2 puffs every 20 minutes for three treatments       Don't forget:  · Rinse

## (undated) NOTE — LETTER
10/01/20        [place] [address] [address]      Dear Elle Maguire,    [facility] records indicate that you have outstanding lab work and or testing that was ordered for you and has not yet been completed:  Orders Placed This Encount

## (undated) NOTE — LETTER
ASTHMA ACTION PLAN for [facility].      : 10/29/1965     Date: 11/15/19  Doctor:  Pepe Mrorison MD  Phone for doctor or clinic: [address], [place] [address] Nose opens wide  Can't walk  Ribs show  Can't talk well Medicine How much to take When to take it    If your symptoms do not improve in ONE hour -  go to the emergency room or call 911 immediately!  If symptoms improve, call office for appointment immediate

## (undated) NOTE — LETTER
ASTHMA ACTION PLAN for [facility].      : 10/29/1965     Date: 10/7/2021  Provider:  ALEJANDRO Lang  Phone for doctor or clinic: [address], [address], [place] [address]